# Patient Record
Sex: MALE | Race: BLACK OR AFRICAN AMERICAN | Employment: FULL TIME | URBAN - NONMETROPOLITAN AREA
[De-identification: names, ages, dates, MRNs, and addresses within clinical notes are randomized per-mention and may not be internally consistent; named-entity substitution may affect disease eponyms.]

---

## 2017-08-24 ENCOUNTER — HOSPITAL ENCOUNTER (EMERGENCY)
Age: 52
Discharge: HOME | End: 2017-08-24

## 2017-08-24 ENCOUNTER — APPOINTMENT (OUTPATIENT)
Dept: GENERAL RADIOLOGY | Age: 52
End: 2017-08-24

## 2017-08-24 VITALS
HEART RATE: 88 BPM | OXYGEN SATURATION: 98 % | TEMPERATURE: 98.1 F | SYSTOLIC BLOOD PRESSURE: 119 MMHG | DIASTOLIC BLOOD PRESSURE: 83 MMHG | RESPIRATION RATE: 18 BRPM

## 2017-08-24 DIAGNOSIS — M54.50 ACUTE MIDLINE LOW BACK PAIN WITHOUT SCIATICA: Primary | ICD-10-CM

## 2017-08-24 PROCEDURE — 99283 EMERGENCY DEPT VISIT LOW MDM: CPT

## 2017-08-24 ASSESSMENT — PAIN DESCRIPTION - LOCATION: LOCATION: BACK

## 2017-08-24 ASSESSMENT — PAIN SCALES - WONG BAKER: WONGBAKER_NUMERICALRESPONSE: 6

## 2017-08-24 ASSESSMENT — ENCOUNTER SYMPTOMS
BACK PAIN: 1
SHORTNESS OF BREATH: 0
COLOR CHANGE: 0

## 2017-08-24 ASSESSMENT — PAIN DESCRIPTION - ORIENTATION: ORIENTATION: LOWER;MID

## 2017-08-24 ASSESSMENT — PAIN DESCRIPTION - PAIN TYPE: TYPE: ACUTE PAIN

## 2017-08-25 ASSESSMENT — ENCOUNTER SYMPTOMS
NAUSEA: 0
RHINORRHEA: 0
WHEEZING: 0
EYE REDNESS: 0
VOMITING: 0
COUGH: 0
DIARRHEA: 0
ABDOMINAL PAIN: 0
EYE DISCHARGE: 0
CONSTIPATION: 0
SORE THROAT: 0

## 2017-09-06 ENCOUNTER — HOSPITAL ENCOUNTER (EMERGENCY)
Age: 52
Discharge: HOME | End: 2017-09-06

## 2017-09-06 VITALS
HEART RATE: 85 BPM | HEIGHT: 74 IN | DIASTOLIC BLOOD PRESSURE: 82 MMHG | BODY MASS INDEX: 18.61 KG/M2 | RESPIRATION RATE: 18 BRPM | TEMPERATURE: 99.1 F | SYSTOLIC BLOOD PRESSURE: 120 MMHG | WEIGHT: 145 LBS | OXYGEN SATURATION: 99 %

## 2017-09-06 DIAGNOSIS — K04.7 DENTAL ABSCESS: Primary | ICD-10-CM

## 2017-09-06 PROCEDURE — 6370000000 HC RX 637 (ALT 250 FOR IP): Performed by: NURSE PRACTITIONER

## 2017-09-06 PROCEDURE — 6360000002 HC RX W HCPCS: Performed by: NURSE PRACTITIONER

## 2017-09-06 PROCEDURE — 99282 EMERGENCY DEPT VISIT SF MDM: CPT

## 2017-09-06 RX ORDER — PENICILLIN V POTASSIUM 500 MG/1
500 TABLET ORAL 4 TIMES DAILY
Qty: 28 TABLET | Refills: 0 | Status: SHIPPED | OUTPATIENT
Start: 2017-09-06 | End: 2017-09-13

## 2017-09-06 RX ADMIN — PENICILLIN G BENZATHINE 0.6 MILLION UNITS: 1200000 INJECTION, SUSPENSION INTRAMUSCULAR at 09:51

## 2017-09-06 RX ADMIN — Medication 10 ML: at 10:11

## 2017-09-06 ASSESSMENT — ENCOUNTER SYMPTOMS
COUGH: 0
WHEEZING: 0
BLOOD IN STOOL: 0
PHOTOPHOBIA: 0
RHINORRHEA: 0
BACK PAIN: 0
EYE REDNESS: 0
ABDOMINAL DISTENTION: 0
SINUS PRESSURE: 0
VOMITING: 0
ABDOMINAL PAIN: 0
NAUSEA: 0
VOICE CHANGE: 0
SHORTNESS OF BREATH: 0
COLOR CHANGE: 0
DIARRHEA: 0
CONSTIPATION: 0
CHEST TIGHTNESS: 0
SORE THROAT: 0

## 2020-01-23 ENCOUNTER — HOSPITAL ENCOUNTER (EMERGENCY)
Age: 55
Discharge: HOME | End: 2020-01-23

## 2020-01-23 VITALS
HEART RATE: 73 BPM | TEMPERATURE: 98.3 F | RESPIRATION RATE: 15 BRPM | OXYGEN SATURATION: 99 % | BODY MASS INDEX: 19.25 KG/M2 | DIASTOLIC BLOOD PRESSURE: 76 MMHG | HEIGHT: 74 IN | SYSTOLIC BLOOD PRESSURE: 137 MMHG | WEIGHT: 150 LBS

## 2020-01-23 PROCEDURE — 99282 EMERGENCY DEPT VISIT SF MDM: CPT

## 2020-01-23 PROCEDURE — 6370000000 HC RX 637 (ALT 250 FOR IP): Performed by: NURSE PRACTITIONER

## 2020-01-23 RX ORDER — LIDOCAINE 4 G/G
1 PATCH TOPICAL DAILY
Status: DISCONTINUED | OUTPATIENT
Start: 2020-01-23 | End: 2020-01-23 | Stop reason: HOSPADM

## 2020-01-23 RX ORDER — NAPROXEN 250 MG/1
500 TABLET ORAL ONCE
Status: COMPLETED | OUTPATIENT
Start: 2020-01-23 | End: 2020-01-23

## 2020-01-23 RX ORDER — NAPROXEN 500 MG/1
500 TABLET ORAL 2 TIMES DAILY WITH MEALS
Qty: 30 TABLET | Refills: 0 | Status: SHIPPED | OUTPATIENT
Start: 2020-01-23 | End: 2020-10-14

## 2020-01-23 RX ORDER — LIDOCAINE 4 G/G
1 PATCH TOPICAL DAILY
Qty: 30 PATCH | Refills: 0 | Status: SHIPPED | OUTPATIENT
Start: 2020-01-23 | End: 2020-02-22

## 2020-01-23 RX ADMIN — NAPROXEN 500 MG: 250 TABLET ORAL at 11:33

## 2020-01-23 ASSESSMENT — PAIN DESCRIPTION - ORIENTATION: ORIENTATION: LOWER

## 2020-01-23 ASSESSMENT — ENCOUNTER SYMPTOMS
SORE THROAT: 0
COLOR CHANGE: 0
SHORTNESS OF BREATH: 0
DIARRHEA: 0
NAUSEA: 0
ABDOMINAL PAIN: 0
CHEST TIGHTNESS: 0
BACK PAIN: 1
VOMITING: 0
ABDOMINAL DISTENTION: 0
RHINORRHEA: 0

## 2020-01-23 ASSESSMENT — PAIN SCALES - GENERAL
PAINLEVEL_OUTOF10: 9
PAINLEVEL_OUTOF10: 9

## 2020-01-23 ASSESSMENT — PAIN DESCRIPTION - DESCRIPTORS: DESCRIPTORS: SHARP;SQUEEZING

## 2020-01-23 ASSESSMENT — PAIN DESCRIPTION - PAIN TYPE: TYPE: ACUTE PAIN

## 2020-01-23 ASSESSMENT — PAIN DESCRIPTION - LOCATION: LOCATION: BACK

## 2020-01-23 NOTE — ED PROVIDER NOTES
[facility] Emergency Department    CHIEF COMPLAINT       Chief Complaint   Patient presents with    Back Pain       Nurses Notes reviewed and I agree except as noted in the HPI. HISTORY OF PRESENT ILLNESS    Chrystal Marin sumaya 49 y.o. male who presents to the ED for evaluation of back pain. Patient states today while he was at work he was lifting a box and he noted sudden lower spinal pain. He denies any radiation of the pain down either legs or up into his upper back. He notes the character \"hurts\". He notes the pain is worse with movement mostly bending over. He denies any difficulty with ambulation, denies any fever, denies unilateral or bilateral paresthesias, denies any incontinence, denies any history of cancer. Notes he is a heavy drinker drinks a sixpack every day and on the weekends drinks a liter of vodka. He smokes a pack of cigarettes every day. Denies any chest pain shortness of breath nausea vomiting abdominal pain. Pain description:  Onset: Today  Location: Lower back  Duration: Constant  Character: \"Hurts\"  Aggravating factors: Movement  Radiation: Denies  Severity: 8/10    Experienced previously: Denies    HPI was provided by the patient. REVIEW OF SYSTEMS     Review of Systems   Constitutional: Negative for chills and fever. HENT: Negative for congestion, rhinorrhea and sore throat. Respiratory: Negative for chest tightness and shortness of breath. Cardiovascular: Negative for chest pain. Gastrointestinal: Negative for abdominal distention, abdominal pain, diarrhea, nausea and vomiting. Genitourinary: Negative for decreased urine volume and difficulty urinating. Negative for incontinence    Musculoskeletal: Positive for back pain. Negative for arthralgias, gait problem, joint swelling, myalgias, neck pain and neck stiffness. Skin: Negative for color change and wound. Allergic/Immunologic: Negative for immunocompromised state.    Neurological: Negative for dizziness, weakness, light-headedness, numbness and headaches. Hematological: Does not bruise/bleed easily. Psychiatric/Behavioral: Negative for agitation, behavioral problems and confusion. PAST MEDICAL HISTORY     Past Medical History:   Diagnosis Date    Hypertension        SURGICALHISTORY      has no past surgical history on file. CURRENT MEDICATIONS       Discharge Medication List as of 1/23/2020 11:23 AM      CONTINUE these medications which have NOT CHANGED    Details   Magic Mouthwash (MIRACLE MOUTHWASH) Swish and spit 5 mLs 4 times daily as needed for Irritation 1:1:1, lidocaine, diphenhydramine, Maalox, Disp-240 mL, R-0Print             ALLERGIES     has No Known Allergies. FAMILY HISTORY     has no family status information on file. family history is not on file. SOCIAL HISTORY       Social History     Socioeconomic History    Marital status:      Spouse name: Not on file    Number of children: Not on file    Years of education: Not on file    Highest education level: Not on file   Occupational History    Not on file   Social Needs    Financial resource strain: Not on file    Food insecurity:     Worry: Not on file     Inability: Not on file    Transportation needs:     Medical: Not on file     Non-medical: Not on file   Tobacco Use    Smoking status: Current Every Day Smoker     Packs/day: 0.50     Types: Cigarettes    Smokeless tobacco: Never Used   Substance and Sexual Activity    Alcohol use:  Yes     Alcohol/week: 42.0 standard drinks     Types: 42 Cans of beer per week     Comment: 6 pack daily    Drug use: No    Sexual activity: Not on file   Lifestyle    Physical activity:     Days per week: Not on file     Minutes per session: Not on file    Stress: Not on file   Relationships    Social connections:     Talks on phone: Not on file     Gets together: Not on file     Attends [employer] service: Not on file     Active member of club or organization: Not on file     Attends meetings of clubs or organizations: Not on file     Relationship status: Not on file    Intimate partner violence:     Fear of current or ex partner: Not on file     Emotionally abused: Not on file     Physically abused: Not on file     Forced sexual activity: Not on file   Other Topics Concern    Not on file   Social History Narrative    Not on file       PHYSICAL EXAM     INITIAL VITALS:  height is 6' 2\" (1.88 m) and weight is 150 lb (68 kg). His oral temperature is 98.3 °F (36.8 °C). His blood pressure is 137/76 and his pulse is 73. His respiration is 15 and oxygen saturation is 99%. Physical Exam  Constitutional:       Appearance: Normal appearance. HENT:      Head: Normocephalic and atraumatic. Nose: Nose normal.      Mouth/Throat:      Mouth: Mucous membranes are moist.   Eyes:      Extraocular Movements: Extraocular movements intact. Conjunctiva/sclera: Conjunctivae normal.   Neck:      Musculoskeletal: Normal range of motion and neck supple. Cardiovascular:      Rate and Rhythm: Normal rate and regular rhythm. Pulses: Normal pulses. Heart sounds: Normal heart sounds. Pulmonary:      Effort: Pulmonary effort is normal.      Breath sounds: Normal breath sounds. Abdominal:      General: Abdomen is flat. Bowel sounds are normal. There is no distension. Palpations: There is no mass. Tenderness: There is no tenderness. Musculoskeletal: Normal range of motion. General: No swelling, tenderness, deformity or signs of injury. Lumbar back: He exhibits bony tenderness (midline s1). He exhibits normal range of motion, no tenderness, no swelling, no edema and no deformity. Skin:     Capillary Refill: Capillary refill takes less than 2 seconds. Neurological:      General: No focal deficit present. Mental Status: He is alert. Sensory: No sensory deficit. Motor: No weakness.       Gait: Gait normal.   Psychiatric: Mood and Affect: Mood normal.         Behavior: Behavior normal.         Thought Content: Thought content normal.         DIFFERENTIAL DIAGNOSIS:   Strain, sprain, fracture  DIAGNOSTIC RESULTS       RADIOLOGY: non-plainfilm images(s) such as CT, Ultrasound and MRI are read by the radiologist.  Plain radiographic images are visualized and preliminarily interpreted by the emergency physician unless otherwise stated below. No orders to display         LABS:   Labs Reviewed - No data to display    EMERGENCY DEPARTMENT COURSE:   Vitals:    Vitals:    01/23/20 1101   BP: 137/76   Pulse: 73   Resp: 15   Temp: 98.3 °F (36.8 °C)   TempSrc: Oral   SpO2: 99%   Weight: 150 lb (68 kg)   Height: 6' 2\" (1.88 m)       MDM    Patient was seen and evaluated in the emergency department, patient appeared to be no acute distress, vital signs were reviewed, no significant findings were noted. Physical exam was completed, he had some midline to left-sided pericervical tenderness at approximately S1. He is able to ambulate with no difficulty had no decreased range of motion that I could note. His deep tendon reflexes were equal bilaterally, he had no red flag symptoms. He was treated with medications below, while here in the emergency department he remained stable. I discussed my findings my plan of care the patient he is amenable discharge. Likely has a lower back strain, will likely improve with time, he is advised to follow-up with a chiropractor if symptoms fail to improve. He verbalized understanding of plan of care. While here in the emergency department maintained stable course appropriate for discharge. Medications   naproxen (NAPROSYN) tablet 500 mg (500 mg Oral Given 1/23/20 1133)       Patient was seenindependently by myself. The patient's final impression and disposition and plan was determined by myself. CRITICAL CARE:   None    CONSULTS:  None    PROCEDURES:  None    FINAL IMPRESSION     1.  Strain of lumbar

## 2020-01-23 NOTE — LETTER
[facility] Emergency Department   [place], [address]          PROOF OF PRESENCE      To Whom It May Concern:    Astrid Sy was present in the Emergency Department at [facility] Emergency Department on 1/23/20.                                      Sincerely,        Eve CRYSTAL

## 2020-01-23 NOTE — ED TRIAGE NOTES
Presents to ER for new onset of back pain that started this morning. Pt states he went to lift something and felt a sharp pain. States the pain has not subsided since then. States pain is in his lower back and he has no hx of back problems. Pt denies any other complaints at this time.

## 2020-09-02 ENCOUNTER — HOSPITAL ENCOUNTER (EMERGENCY)
Age: 55
Discharge: HOME | End: 2020-09-02
Attending: EMERGENCY MEDICINE

## 2020-09-02 ENCOUNTER — APPOINTMENT (OUTPATIENT)
Dept: GENERAL RADIOLOGY | Age: 55
End: 2020-09-02

## 2020-09-02 VITALS
DIASTOLIC BLOOD PRESSURE: 82 MMHG | WEIGHT: 145 LBS | OXYGEN SATURATION: 99 % | HEIGHT: 74 IN | TEMPERATURE: 99.1 F | HEART RATE: 93 BPM | BODY MASS INDEX: 18.61 KG/M2 | SYSTOLIC BLOOD PRESSURE: 145 MMHG | RESPIRATION RATE: 18 BRPM

## 2020-09-02 PROCEDURE — 99282 EMERGENCY DEPT VISIT SF MDM: CPT

## 2020-09-02 PROCEDURE — 73630 X-RAY EXAM OF FOOT: CPT

## 2020-09-02 PROCEDURE — 6370000000 HC RX 637 (ALT 250 FOR IP): Performed by: EMERGENCY MEDICINE

## 2020-09-02 RX ORDER — HYDROCODONE BITARTRATE AND ACETAMINOPHEN 5; 325 MG/1; MG/1
1 TABLET ORAL ONCE
Status: COMPLETED | OUTPATIENT
Start: 2020-09-02 | End: 2020-09-02

## 2020-09-02 RX ADMIN — HYDROCODONE BITARTRATE AND ACETAMINOPHEN 1 TABLET: 5; 325 TABLET ORAL at 11:56

## 2020-09-02 ASSESSMENT — PAIN DESCRIPTION - LOCATION: LOCATION: TOE (COMMENT WHICH ONE)

## 2020-09-02 ASSESSMENT — PAIN DESCRIPTION - ORIENTATION: ORIENTATION: LEFT

## 2020-09-02 ASSESSMENT — PAIN SCALES - GENERAL
PAINLEVEL_OUTOF10: 10
PAINLEVEL_OUTOF10: 10

## 2020-09-02 ASSESSMENT — PAIN DESCRIPTION - PAIN TYPE: TYPE: ACUTE PAIN

## 2020-09-02 NOTE — ED NOTES
Patient presents to the ED with complaints of having left big toe pain and swelling. He states that he kicked a wall in his house this morning. He has no other complaints at this time.       Stacie Nair LPN  97/84/03 [id number]

## 2020-09-02 NOTE — LETTER
[facility] Emergency Department   [place], [address]          PROOF OF PRESENCE      To Whom It May Concern:    Amanda Freeman was present in the Emergency Department at [facility] [place] Emergency Department on 9/2/2020.                                      Sincerely,        Registered Nurse

## 2020-09-02 NOTE — ED PROVIDER NOTES
[facility] EMERGENCY DEPT      CHIEF COMPLAINT       Chief Complaint   Patient presents with    Toe Pain     left big toe       Nurses Notes reviewed and I agree except as noted in the HPI. HISTORY OF PRESENT ILLNESS    Iqra Bryant is a 48 y.o. male who presents complaint of toe pain, patient kicked a wall by mistake last night. Onset: acute  Duration:last night   Timing: Constant  Location of Pain: Left toe pain  Intesity/severity: Mild  Modifying Factors: Trauma  Relieved by;  Previous Episodes; Tx Before arrival: None  REVIEW OF SYSTEMS      Review of Systems   Constitutional: Negative for fever, chills, diaphoresis and fatigue. HENT: Negative for congestion, drooling, facial swelling and sore throat. Eyes: Negative for photophobia, pain and discharge. Respiratory: Negative for cough, shortness of breath, wheezing and stridor. Cardiovascular: Negative for chest pain, palpitations and leg swelling. Gastrointestinal: Negative for abdominal pain, blood in stool and abdominal distention. Genitourinary: Negative for dysuria, urgency, hematuria and difficulty urinating. Musculoskeletal: Negative for gait problem, neck pain and neck stiffness. Positive for left great toe pain  Skin; No rash, No itching  Neurological: Negative for seizures, weakness and numbness. Psychiatric/Behavioral: Negative for hallucinations, confusion and agitation. PAST MEDICAL HISTORY    has a past medical history of Hypertension. SURGICAL HISTORY      has no past surgical history on file.     CURRENT MEDICATIONS       Discharge Medication List as of 9/2/2020 11:23 AM      CONTINUE these medications which have NOT CHANGED    Details   naproxen (NAPROSYN) 500 MG tablet Take 1 tablet by mouth 2 times daily (with meals) for 30 doses, Disp-30 tablet, R-0Print      Magic Mouthwash (MIRACLE MOUTHWASH) Swish and spit 5 mLs 4 times daily as needed for Irritation 1:1:1, lidocaine, diphenhydramine, Maalox, Disp-240 mL, R-0Print             ALLERGIES     has No Known Allergies. FAMILY HISTORY     has no family status information on file. family history is not on file. SOCIAL HISTORY      reports that he has been smoking cigarettes. He has been smoking about 0.50 packs per day. He has never used smokeless tobacco. He reports current alcohol use of about 42.0 standard drinks of alcohol per week. He reports that he does not use drugs. PHYSICAL EXAM     INITIAL VITALS:  height is 6' 2\" (1.88 m) and weight is 145 lb (65.8 kg). His oral temperature is 99.1 °F (37.3 °C). His blood pressure is 145/82 (abnormal) and his pulse is 93. His respiration is 18 and oxygen saturation is 99%. Physical Exam   Constitutional:  well-developed and well-nourished. HENT: Head: Normocephalic, atraumatic, Bilateral external ears normal, Oropharynx mosit, No oral exudates, Nose normal.   Eyes: PERRL, EOMI, Conjunctiva normal, No discharge. No scleral icterus  Neck: Normal range of motion, No tenderness, Supple  Cardiovascular: Normal rate, regular rhythm, S1 normal and S2 normal.  Exam reveals no gallop. Pulmonary/Chest: Effort normal and breath sounds normal. No accessory muscle usage or stridor. No respiratory distress. no wheezes. has no rales. exhibits no tenderness. Abdominal: Soft. Bowel sounds are normal.  exhibits no distension. There is no tenderness. There is no rebound and no guarding. Extremities: No edema, left great toe tenderness, no cyanosis, no clubbing. Neurological: Alert and oriented ×3, normal motor function, normal sensory function, no focal deficits. GCS 15  Skin: Skin is warm, dry and intact. No rash noted. No erythema.    Psychiatric: Affect normal, judgment normal, mood normal.  DIFFERENTIAL DIAGNOSIS:   Toe fracture chest contusion,    DIAGNOSTIC RESULTS     EKG: All EKG's are interpreted by the Emergency Department Physician who either signs or Co-signs this chart in the absence of a cardiologist.      RADIOLOGY: non-plain film images(s) such as CT, Ultrasound and MRI are read by the radiologist.  Plain radiographic images are visualized and preliminarily interpreted by the emergency physician unless otherwise stated below. LABS:   Labs Reviewed - No data to display    EMERGENCY DEPARTMENT COURSE:   Vitals:    Vitals:    09/02/20 1027   BP: (!) 145/82   Pulse: 93   Resp: 18   Temp: 99.1 °F (37.3 °C)   TempSrc: Oral   SpO2: 99%   Weight: 145 lb (65.8 kg)   Height: 6' 2\" (1.88 m)     Patient presenting with left toe pain, states that he kicked a wall by accident. CRITICAL CARE:       CONSULTS:  None    PROCEDURES:  None    FINAL IMPRESSION      1.  Contusion of left great toe without damage to nail, initial encounter          DISPOSITION/PLAN   Decision To Discharge    PATIENT REFERRED TO:  [address] [place] [address] [address] [address] EMERGENCY DEPT  [address] [address]  [phone]    As needed      DISCHARGE MEDICATIONS:  Discharge Medication List as of 9/2/2020 11:23 AM          (Please note that portions of this note were completed with a voice recognition program.  Efforts were made to edit the dictations but occasionally words are mis-transcribed.)    DO Faith Arriaza DO  09/02/20 1717

## 2020-10-14 ENCOUNTER — HOSPITAL ENCOUNTER (EMERGENCY)
Age: 55
Discharge: HOME | End: 2020-10-14

## 2020-10-14 ENCOUNTER — APPOINTMENT (OUTPATIENT)
Dept: GENERAL RADIOLOGY | Age: 55
End: 2020-10-14

## 2020-10-14 VITALS
BODY MASS INDEX: 18.61 KG/M2 | DIASTOLIC BLOOD PRESSURE: 67 MMHG | OXYGEN SATURATION: 98 % | WEIGHT: 145 LBS | SYSTOLIC BLOOD PRESSURE: 134 MMHG | RESPIRATION RATE: 14 BRPM | HEART RATE: 87 BPM | HEIGHT: 74 IN | TEMPERATURE: 99.1 F

## 2020-10-14 PROCEDURE — 99281 EMR DPT VST MAYX REQ PHY/QHP: CPT

## 2020-10-14 PROCEDURE — 73564 X-RAY EXAM KNEE 4 OR MORE: CPT

## 2020-10-14 PROCEDURE — 99282 EMERGENCY DEPT VISIT SF MDM: CPT

## 2020-10-14 RX ORDER — NAPROXEN 500 MG/1
250 TABLET ORAL 2 TIMES DAILY WITH MEALS
Qty: 30 TABLET | Refills: 0 | Status: SHIPPED | OUTPATIENT
Start: 2020-10-14 | End: 2021-01-21

## 2020-10-14 ASSESSMENT — ENCOUNTER SYMPTOMS
SHORTNESS OF BREATH: 0
EYES NEGATIVE: 1
GASTROINTESTINAL NEGATIVE: 1

## 2020-10-14 NOTE — ED PROVIDER NOTES
[facility] Emergency Department    CHIEF COMPLAINT       Chief Complaint   Patient presents with    Joint Swelling       Nurses Notes reviewed and I agree except as noted in the HPI. HISTORY OF PRESENT ILLNESS    Demetrice Cruz is a 48 y.o. male who presents to the ED for evaluation of knee swelling. Pt notes swelling to left knee onset 2 weeks ago. He has not taken anything at home for pain. Pt denies any injury to the area. He is able to ambulate on the leg. Denies chest pain, SOB. Pain description:  Onset: Sudden  Location: Left knee  Duration: 2 weeks  Aggravating factors: Deep palpation  Radiation: none  Timing: Constant    Experienced previously: no    HPI was provided by the patient. REVIEW OF SYSTEMS     Review of Systems   Constitutional: Negative. HENT: Negative. Eyes: Negative. Respiratory: Negative for shortness of breath. Cardiovascular: Negative for chest pain. Gastrointestinal: Negative. Genitourinary: Negative. Musculoskeletal: Positive for arthralgias and joint swelling. Neurological: Negative for dizziness and light-headedness. PAST MEDICAL HISTORY     Past Medical History:   Diagnosis Date    Hypertension        SURGICALHISTORY      has no past surgical history on file. CURRENT MEDICATIONS       Discharge Medication List as of 10/14/2020  3:37 PM          ALLERGIES     has No Known Allergies. FAMILY HISTORY     has no family status information on file. family history is not on file.     SOCIAL HISTORY       Social History     Socioeconomic History    Marital status:      Spouse name: Not on file    Number of children: Not on file    Years of education: Not on file    Highest education level: Not on file   Occupational History    Not on file   Social Needs    Financial resource strain: Not on file    Food insecurity     Worry: Not on file     Inability: Not on file    Transportation needs     Medical: Not on file     Non-medical: Not on file   Tobacco Use    Smoking status: Current Every Day Smoker     Packs/day: 0.50     Types: Cigarettes    Smokeless tobacco: Never Used   Substance and Sexual Activity    Alcohol use: Yes     Alcohol/week: 42.0 standard drinks     Types: 42 Cans of beer per week     Comment: 6 pack daily    Drug use: No    Sexual activity: Not on file   Lifestyle    Physical activity     Days per week: Not on file     Minutes per session: Not on file    Stress: Not on file   Relationships    Social connections     Talks on phone: Not on file     Gets together: Not on file     Attends Rastafarian service: Not on file     Active member of club or organization: Not on file     Attends meetings of clubs or organizations: Not on file     Relationship status: Not on file    Intimate partner violence     Fear of current or ex partner: Not on file     Emotionally abused: Not on file     Physically abused: Not on file     Forced sexual activity: Not on file   Other Topics Concern    Not on file   Social History Narrative    Not on file       PHYSICAL EXAM     INITIAL VITALS:  height is 6' 2\" (1.88 m) and weight is 145 lb (65.8 kg). His oral temperature is 99.1 °F (37.3 °C). His blood pressure is 134/67 and his pulse is 87. His respiration is 14 and oxygen saturation is 98%. Physical Exam  Vitals signs and nursing note reviewed. Constitutional:       General: He is not in acute distress. Appearance: Normal appearance. He is not ill-appearing, toxic-appearing or diaphoretic. HENT:      Head: Normocephalic and atraumatic. Eyes:      Extraocular Movements: Extraocular movements intact. Conjunctiva/sclera: Conjunctivae normal.      Pupils: Pupils are equal, round, and reactive to light. Neck:      Musculoskeletal: Normal range of motion and neck supple. Cardiovascular:      Rate and Rhythm: Normal rate and regular rhythm. Pulses: Normal pulses. Heart sounds: Normal heart sounds.    Pulmonary: Effort: Pulmonary effort is normal. No respiratory distress. Breath sounds: Normal breath sounds. Abdominal:      General: Abdomen is flat. Musculoskeletal: Normal range of motion. Left knee: He exhibits swelling and bony tenderness (very minimal). He exhibits no ecchymosis and no deformity. Skin:     General: Skin is warm and dry. Neurological:      General: No focal deficit present. Mental Status: He is alert and oriented to person, place, and time. Psychiatric:         Mood and Affect: Mood normal.         Behavior: Behavior normal.         DIFFERENTIAL DIAGNOSIS:   Knee effusion, Baker's cyst, bursitis, tendinitis    DIAGNOSTIC RESULTS       RADIOLOGY: non-plainfilm images(s) such as CT, Ultrasound and MRI are read by the radiologist.  Plain radiographic images are visualized and preliminarily interpreted by the emergency physician unless otherwise stated below. XR KNEE LEFT (MIN 4 VIEWS)   Final Result    IMPRESSION:    Soft tissue swelling over the anterior knee. No fracture or subluxation. **This report has been created using voice recognition software. It may contain minor errors which are inherent in voice recognition technology. **      Final report electronically signed by Dr. Joyce Wise on 10/14/2020 2:47 PM            LABS:   Labs Reviewed - No data to display    EMERGENCY DEPARTMENT COURSE:   Vitals:    Vitals:    10/14/20 1409   BP: 134/67   Pulse: 87   Resp: 14   Temp: 99.1 °F (37.3 °C)   TempSrc: Oral   SpO2: 98%   Weight: 145 lb (65.8 kg)   Height: 6' 2\" (1.88 m)         MDM    Patient was seen and evaluated in the emergency department, patient appeared to be in no acute distress, vital signs were reviewed, no septic bodies are noted. Physical exam is completed, he has some posterior knee edema consistent with a Baker's cyst, he has some anterior knee edema, possibly bursitis. X-rays were performed and soft tissue swelling was noted to the anterior knee. Discussed my findings and plan of care the patient has minimal discharge. We will put him on some anti-inflammatory medicine, he is advised to use Ace wrap. He verbalized understanding of plan of care. Medications - No data to display    Patient was seenindependently by myself. The patient's final impression and disposition and plan was determined by myself. CRITICAL CARE:   None    CONSULTS:  None    PROCEDURES:  None    FINAL IMPRESSION     1. Effusion of left knee joint    2.  Synovial cyst of left popliteal space          DISPOSITION/PLAN   Patient discharged in stable condition    PATIENT REFERREDTO:  Clarence Orozco   [address] [place] [phone]  Call in 1 week  For follow up and evaluation if symptoms worsen      DISCHARGE MEDICATIONS:  Discharge Medication List as of 10/14/2020  3:37 PM          (Please note that portions of this note were completed with a voice recognition program.  Efforts were made to edit the dictations but occasionally words are mis-transcribed.)        [facility] Counts, ORTIZ 10/14/20 9:38 PM    [facility] CountsYoandy, APRN - CNP  10/14/20 [id number]

## 2020-10-14 NOTE — LETTER
[facility] [address] [address] EMERGENCY DEPT  [address] [place] [id number]  Phone: [phone]               October 14, 2020    Patient: Ramone Bryan   YOB: 1965   Date of Visit: 10/14/2020       To Whom It May Concern:    Sherif Townsend was seen and treated in our emergency department on 10/14/2020. He may return to work on 10/15/2020.       Sincerely,       STEPHANIE Robins CNP         Signature:__________________________________

## 2021-01-21 ENCOUNTER — HOSPITAL ENCOUNTER (EMERGENCY)
Age: 56
Discharge: HOME | End: 2021-01-21
Payer: COMMERCIAL

## 2021-01-21 VITALS
HEART RATE: 82 BPM | OXYGEN SATURATION: 99 % | SYSTOLIC BLOOD PRESSURE: 129 MMHG | HEIGHT: 74 IN | TEMPERATURE: 98.1 F | RESPIRATION RATE: 18 BRPM | BODY MASS INDEX: 19.25 KG/M2 | WEIGHT: 150 LBS | DIASTOLIC BLOOD PRESSURE: 72 MMHG

## 2021-01-21 DIAGNOSIS — M79.601 RIGHT ARM PAIN: ICD-10-CM

## 2021-01-21 DIAGNOSIS — M25.521 ARTHRALGIA OF RIGHT ELBOW: ICD-10-CM

## 2021-01-21 DIAGNOSIS — M79.602 LEFT ARM PAIN: Primary | ICD-10-CM

## 2021-01-21 PROCEDURE — 99282 EMERGENCY DEPT VISIT SF MDM: CPT

## 2021-01-21 PROCEDURE — 6370000000 HC RX 637 (ALT 250 FOR IP): Performed by: NURSE PRACTITIONER

## 2021-01-21 RX ORDER — TIZANIDINE HYDROCHLORIDE 4 MG/1
4 CAPSULE, GELATIN COATED ORAL 3 TIMES DAILY PRN
Qty: 24 CAPSULE | Refills: 0 | Status: SHIPPED | OUTPATIENT
Start: 2021-01-21 | End: 2022-03-24

## 2021-01-21 RX ORDER — ETODOLAC 400 MG/1
400 TABLET, FILM COATED ORAL 2 TIMES DAILY
Qty: 60 TABLET | Refills: 3 | Status: SHIPPED | OUTPATIENT
Start: 2021-01-21

## 2021-01-21 RX ORDER — TRAMADOL HYDROCHLORIDE 50 MG/1
50 TABLET ORAL ONCE
Status: COMPLETED | OUTPATIENT
Start: 2021-01-21 | End: 2021-01-21

## 2021-01-21 RX ADMIN — TRAMADOL HYDROCHLORIDE 50 MG: 50 TABLET ORAL at 12:13

## 2021-01-21 ASSESSMENT — ENCOUNTER SYMPTOMS
VOMITING: 0
NAUSEA: 0
SHORTNESS OF BREATH: 0
COLOR CHANGE: 0

## 2021-01-21 ASSESSMENT — PAIN SCALES - GENERAL: PAINLEVEL_OUTOF10: 7

## 2021-01-21 NOTE — ED NOTES
Patient presents to the ED with complaints of bilateral achy arms. He denies having pain in his arms. He has no other complaints at this time.       Mortimer Lange, LPN  39/58/[id number]

## 2021-01-21 NOTE — ED PROVIDER NOTES
[facility] COMPLAINT       Chief Complaint   Patient presents with    Arm Pain     bilateral       Nurses Notes reviewed and I agree except as noted in the HPI. HISTORY OF PRESENT ILLNESS    Christine Medina is a 48 y.o. male who presents to the Emergency Department for the evaluation of bilateral arm pain. 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 This pain has been going on for a long time now and not getting any better. Stated it had a gradual onset and is an achy pain that is intermittent at the distal aspect of his bicep muscle and in the cubital fossa bilaterally. Denies radiation. He doesn't have any pain at rest, only upon flexion of the elbow. Tylenol provided him no relief. Stated he has had no prior injury. Denies fever, chills, decrease ROM, paresthesias, numbness, swelling. The HPI was provided by the patient. REVIEW OF SYSTEMS     Review of Systems   Constitutional: Negative for activity change, chills and fever. Respiratory: Negative for shortness of breath. Cardiovascular: Negative for chest pain. Gastrointestinal: Negative for nausea and vomiting. Musculoskeletal: Positive for myalgias. Negative for arthralgias, gait problem and joint swelling. Negative for decrease ROM   Skin: Negative for color change and rash. Negative for ecchymosis. Neurological: Negative for weakness and numbness. PAST MEDICAL HISTORY    has a past medical history of Hypertension. SURGICAL HISTORY      has no past surgical history on file. CURRENT MEDICATIONS       Discharge Medication List as of 1/21/2021 12:20 PM          ALLERGIES     has No Known Allergies. FAMILY HISTORY     has no family status information on file. family history is not on file. SOCIAL HISTORY      reports that he has been smoking cigarettes.  He has been smoking about 0.50 Orthopedic Postop Progress Note    Postop day: s/p TKA    ID: The patient is a 64 y.o. female status post: TKA, doing well, pain controlled    Overnight Events: naeon per nurse  Worked with PT    Vitals:    11/24/20 1326   BP:    Pulse:    Resp: 16   Temp:        Drain Output  11/23 0701 - 11/24 0700  In: 2350   Out: 100     Physical Exam:  NAD, A/O x 3.  Wound c/d/i with clean dressing.  No focal motor or sensory deficits noted.    Assessment: The patient is a 64 y.o. female status post: tka    Plan:  1) Antibiotics: psot op ancef x 24 hrs then po abx x 24 h  2) Weight bearing status: wbat  3) Labs: reviewed  4) DVT Prophylaxis: asa  5) Lines/Drains: piv  6) Dispo: home with HH after PM PT    MD Sarita     packs per day. He has never used smokeless tobacco. He reports current alcohol use of about 42.0 standard drinks of alcohol per week. He reports that he does not use drugs. PHYSICAL EXAM     INITIAL VITALS:  height is 6' 2\" (1.88 m) and weight is 150 lb (68 kg). His oral temperature is 98.1 °F (36.7 °C). His blood pressure is 129/72 and his pulse is 82. His respiration is 18 and oxygen saturation is 99%. Physical Exam  Vitals signs and nursing note reviewed. Constitutional:       General: He is awake. He is not in acute distress. Appearance: Normal appearance. He is well-developed and normal weight. He is not ill-appearing, toxic-appearing or diaphoretic. Cardiovascular:      Pulses:           Radial pulses are 2+ on the right side and 2+ on the left side. Musculoskeletal:      Right shoulder: Normal. He exhibits normal range of motion, no tenderness, no bony tenderness, no swelling and no deformity. Left shoulder: Normal. He exhibits normal range of motion, no tenderness, no bony tenderness, no swelling and no deformity. Right elbow: He exhibits normal range of motion, no swelling, no effusion and no deformity. No tenderness found. No radial head, no medial epicondyle, no lateral epicondyle and no olecranon process tenderness noted. Left elbow: He exhibits normal range of motion, no swelling, no effusion and no deformity. No tenderness found. No radial head, no medial epicondyle, no lateral epicondyle and no olecranon process tenderness noted. Right wrist: Normal. He exhibits normal range of motion, no tenderness, no bony tenderness, no swelling and no deformity. Left wrist: Normal. He exhibits normal range of motion, no tenderness, no bony tenderness, no swelling and no deformity. Comments: Right and left elbow and cubital fossa-No erythema, edema, lesions, nodules. No warmth or tenderness to palpation.  AROM normal. Flexion of elbow reproduces pain in distal aspect of bicep muscle bilaterally. Pain reproduced upon flexion against resistance. Left and right wrist- no erythema, edema, ecchymosis, lesions. No warmth or tenderness to palpation. AROM normal. Extension of left wrist reproduces pain. Extension and flexion of left wrist against resistance reproduces pain. 5/5 strength upper extremities. Skin:     General: Skin is warm. Capillary Refill: Capillary refill takes less than 2 seconds. Coloration: Skin is not cyanotic or pale. Neurological:      Mental Status: He is alert and oriented to person, place, and time. Sensory: Sensation is intact. No sensory deficit. Motor: Motor function is intact. No weakness or atrophy. Deep Tendon Reflexes:      Reflex Scores:       Brachioradialis reflexes are 2+ on the right side and 2+ on the left side. Psychiatric:         Behavior: Behavior is cooperative. DIFFERENTIAL DIAGNOSIS:   Muscle strain, tendinopathy, superficial DVT arm, contusion, fracture, arthritis    DIAGNOSTIC RESULTS     EKG: All EKG's are interpreted by the Emergency Department Physician who either signs or Co-signs this chart in the absence of a cardiologist.    None    RADIOLOGY: non-plainfilm images(s) such as CT, Ultrasound and MRI are read by the radiologist.    No orders to display       LABS:     Labs Reviewed - No data to display    EMERGENCY DEPARTMENT COURSE:   Vitals:    Vitals:    01/21/21 1037   BP: 129/72   Pulse: 82   Resp: 18   Temp: 98.1 °F (36.7 °C)   TempSrc: Oral   SpO2: 99%   Weight: 150 lb (68 kg)   Height: 6' 2\" (1.88 m)       11:09 AM EST: The patient was seen and evaluated. MDM:  Patient seen evaluated a for bilateral arm pain. Patient has pain with flexion at distal end of bicep. Believe that this is likely repetitive use. Pain was treated appropriately, no imaging indicated. Discussed rest, muscle relaxers, NSAIDs.   Recommended follow-up with orthopedics and occupational health if pain persist.  Patient had no other complaints or concerns. He was amenable plan for discharge and departed the ED in stable condition    CRITICAL CARE:   None    CONSULTS:  None    PROCEDURES:  None    FINAL IMPRESSION      1. Left arm pain    2. Right arm pain    3. Arthralgia of right elbow          DISPOSITION/PLAN   Discharge    PATIENT REFERRED TO:  [address] [address],[address] [facility]. [facility]  Schedule an appointment as soon as possible for a visit         DISCHARGE MEDICATIONS:  Discharge Medication List as of 1/21/2021 12:20 PM      START taking these medications    Details   etodolac (LODINE) 400 MG tablet Take 1 tablet by mouth 2 times daily, Disp-60 tablet, R-3Normal      tiZANidine (ZANAFLEX) 4 MG capsule Take 1 capsule by mouth 3 times daily as needed for Muscle spasms, Disp-24 capsule, R-0Normal             (Please note that portions of this note were completed with a voice recognition program.  Efforts were made to edit the dictations but occasionally words are mis-transcribed.)    The patient was given an opportunity to see the Emergency Attending. The patient voiced understanding that I was a Mid-LevelProvider and was in agreement with being seen independently by myself.      STEPHANIE Charles CNP, 1/21/21, 5:09 PM       STEPHANIE Charles CNP  01/21/21 5246

## 2021-08-01 ENCOUNTER — HOSPITAL ENCOUNTER (EMERGENCY)
Age: 56
Discharge: HOME | End: 2021-08-01
Payer: COMMERCIAL

## 2021-08-01 VITALS
DIASTOLIC BLOOD PRESSURE: 78 MMHG | BODY MASS INDEX: 17.97 KG/M2 | HEIGHT: 74 IN | OXYGEN SATURATION: 100 % | RESPIRATION RATE: 14 BRPM | TEMPERATURE: 98.2 F | HEART RATE: 82 BPM | SYSTOLIC BLOOD PRESSURE: 140 MMHG | WEIGHT: 140 LBS

## 2021-08-01 DIAGNOSIS — M54.41 RIGHT-SIDED LOW BACK PAIN WITH RIGHT-SIDED SCIATICA, UNSPECIFIED CHRONICITY: Primary | ICD-10-CM

## 2021-08-01 PROCEDURE — 99282 EMERGENCY DEPT VISIT SF MDM: CPT

## 2021-08-01 RX ORDER — CYCLOBENZAPRINE HCL 10 MG
10 TABLET ORAL 3 TIMES DAILY PRN
Qty: 6 TABLET | Refills: 0 | Status: SHIPPED | OUTPATIENT
Start: 2021-08-01 | End: 2021-08-04

## 2021-08-01 RX ORDER — CYCLOBENZAPRINE HCL 10 MG
10 TABLET ORAL EVERY 8 HOURS PRN
Status: DISCONTINUED | OUTPATIENT
Start: 2021-08-01 | End: 2021-08-01

## 2021-08-01 ASSESSMENT — PAIN DESCRIPTION - FREQUENCY: FREQUENCY: CONTINUOUS

## 2021-08-01 ASSESSMENT — PAIN SCALES - GENERAL: PAINLEVEL_OUTOF10: 8

## 2021-08-01 ASSESSMENT — PAIN DESCRIPTION - LOCATION: LOCATION: BACK

## 2021-08-01 ASSESSMENT — PAIN DESCRIPTION - ORIENTATION: ORIENTATION: LOWER;RIGHT

## 2021-08-01 ASSESSMENT — PAIN DESCRIPTION - PAIN TYPE: TYPE: ACUTE PAIN

## 2021-08-01 ASSESSMENT — PAIN DESCRIPTION - DESCRIPTORS: DESCRIPTORS: SORE

## 2021-08-01 NOTE — ED TRIAGE NOTES
Patient presents to ER with complaints of right lower back pain the radiates down right leg that started 2 weeks ago. Pain reported 8 on a scale of 0-10, described as sore.

## 2021-08-01 NOTE — ED PROVIDER NOTES
[address]    Pt Name: Dary Mccallum  MRN: [id number]  [place] 1965  Date of evaluation: 9/12/20      I personally saw and examined the patient. I have reviewed and agree with the Resident findings, including all diagnostic interpretations and treatment plans as written. I was present for the key portion of any procedures performed and the inclusive time noted in any critical care statement. History: This patient was seen in conjunction with Juana Scott, resident physician    This is a 56-year-old male who ambulated in through triage. He is complaining of back pain with radiculopathy down the right lower extremity. He ambulates with a completely normal-looking gait. No antalgic gait at all. No history of any previous surgeries. No fever. No recent falls. Reflexes in knees and ankles are normal.  We will put him on some Flexeril. He is advised to have follow-up in 3 to 5 days.                 Tong Escobedo,   08/01/21 8383

## 2021-08-01 NOTE — ED PROVIDER NOTES
[facility] ENCOUNTER          Pt Name: Avery Baker  MRN: [id number]  [place] 1965  Date of evaluation: 8/1/2021  Treating Resident Physician: Sandra Loyd DO  Supervising Physician: Dr. Cayetano Epley       Chief Complaint   Patient presents with    Back Pain     right lower radiates down right leg     History obtained from the patient. HISTORY OF PRESENT ILLNESS    HPI  Avery Baker is a 55 y.o. male who presents to the emergency department for evaluation of right-sided hip pain x1 month. Patient describes the pain as an 8 out of 10, sharp, constant with radiation down the lateral side of the right leg. Worse when laying down or pressure put upon it. Alleviating factors. Patient has tried ice along with OTC topical with minimal relief. Denies any history of surgeries or recent falls/injuries to the area. He does admit to some numbness and tingling on his right side. He denies saddle anesthesia or loss of bowels or bladder. He denies chest pain, sob, cough, pain radiating to back, n/v/d/c, fever, headache, chills. He states he takes no current medications. Has no prior medical history. Denies any surgical history. Does smoke 1/2 ppd x50 years and drinks 6 beers a day. Patient ambulates normally. No antalgic gait identified. The patient has no other acute complaints at this time. REVIEW OF SYSTEMS   Review of Systems    A comprehensive 12 point review of systems has been conducted and is otherwise negative except for what is noted above in the HPI. PAST MEDICAL AND SURGICAL HISTORY     Past Medical History:   Diagnosis Date    Hypertension      No past surgical history on file. MEDICATIONS   No current facility-administered medications for this encounter.     Current Outpatient Medications:     etodolac (LODINE) 400 MG tablet, Take 1 tablet by mouth 2 times daily, Disp: 60 tablet, Rfl: 3   tiZANidine (ZANAFLEX) 4 MG capsule, Take 1 capsule by mouth 3 times daily as needed for Muscle spasms, Disp: 24 capsule, Rfl: 0      SOCIAL HISTORY     Social History     Social History Narrative    Not on file     Social History     Tobacco Use    Smoking status: Current Every Day Smoker     Packs/day: 0.50     Types: Cigarettes    Smokeless tobacco: Never Used   Substance Use Topics    Alcohol use: Yes     Alcohol/week: 42.0 standard drinks     Types: 42 Cans of beer per week     Comment: 6 pack daily    Drug use: No         ALLERGIES   No Known Allergies      FAMILY HISTORY   No family history on file. PREVIOUS RECORDS   Previous records reviewed:         PHYSICAL EXAM     ED Triage Vitals [08/01/21 0753]   BP Temp Temp src Pulse Resp SpO2 Height Weight   (!) 140/78 98.2 °F (36.8 °C) -- 82 14 100 % 6' 2\" (1.88 m) 140 lb (63.5 kg)     Initial vital signs and nursing assessment reviewed and abnormal from HTN. Body mass index is 17.97 kg/m². Pulsoximetry is normal per my interpretation. Additional Vital Signs:  Vitals:    08/01/21 0753   BP: (!) 140/78   Pulse: 82   Resp: 14   Temp: 98.2 °F (36.8 °C)   SpO2: 100%       Physical Exam  Constitutional:       General: He is awake. Appearance: Normal appearance. He is normal weight. HENT:      Head: Normocephalic and atraumatic. Eyes:      Extraocular Movements: Extraocular movements intact. Pupils: Pupils are equal, round, and reactive to light. Neck:      Thyroid: No thyromegaly. Cardiovascular:      Rate and Rhythm: Normal rate and regular rhythm. Heart sounds: S1 normal and S2 normal. No murmur heard. No friction rub. No gallop. Pulmonary:      Effort: Pulmonary effort is normal.      Breath sounds: Normal breath sounds and air entry. Abdominal:      General: There is no distension. There are no signs of injury. Palpations: There is no pulsatile mass. Tenderness: There is no abdominal tenderness.    Musculoskeletal: the assistance of an ED scribe. The transcription may contain errors not detected in proofreading. Please refer to my supervising physician's documentation if my documentation differs.     Electronically Signed: Keaton Lara DO, 08/01/21, 8:45 AM         Josesito Long DO  Resident  08/01/21 1808 Mick Alfonso,   Resident  08/01/21 1808 Mick Alofnso,   Resident  08/04/21 6548

## 2021-08-04 ENCOUNTER — APPOINTMENT (OUTPATIENT)
Dept: GENERAL RADIOLOGY | Age: 56
End: 2021-08-04
Payer: COMMERCIAL

## 2021-08-04 ENCOUNTER — HOSPITAL ENCOUNTER (EMERGENCY)
Age: 56
Discharge: HOME | End: 2021-08-04
Attending: EMERGENCY MEDICINE
Payer: COMMERCIAL

## 2021-08-04 VITALS
WEIGHT: 140 LBS | RESPIRATION RATE: 16 BRPM | TEMPERATURE: 98.5 F | SYSTOLIC BLOOD PRESSURE: 119 MMHG | BODY MASS INDEX: 17.97 KG/M2 | HEIGHT: 74 IN | HEART RATE: 93 BPM | DIASTOLIC BLOOD PRESSURE: 69 MMHG | OXYGEN SATURATION: 100 %

## 2021-08-04 DIAGNOSIS — M19.90 ARTHRITIS: ICD-10-CM

## 2021-08-04 DIAGNOSIS — M25.551 RIGHT HIP PAIN: Primary | ICD-10-CM

## 2021-08-04 PROCEDURE — 73502 X-RAY EXAM HIP UNI 2-3 VIEWS: CPT

## 2021-08-04 PROCEDURE — 99282 EMERGENCY DEPT VISIT SF MDM: CPT

## 2021-08-04 RX ORDER — PREDNISONE 10 MG/1
TABLET ORAL
Qty: 16 TABLET | Refills: 0 | Status: SHIPPED | OUTPATIENT
Start: 2021-08-04

## 2021-08-04 ASSESSMENT — PAIN SCALES - GENERAL: PAINLEVEL_OUTOF10: 9

## 2021-08-04 ASSESSMENT — ENCOUNTER SYMPTOMS
NAUSEA: 0
VOMITING: 0
SORE THROAT: 0
SINUS PRESSURE: 0
WHEEZING: 0
DIARRHEA: 0
CONSTIPATION: 0
CHEST TIGHTNESS: 0
VOICE CHANGE: 0
RHINORRHEA: 0
BACK PAIN: 0
ABDOMINAL PAIN: 0
COUGH: 0
TROUBLE SWALLOWING: 0
SHORTNESS OF BREATH: 0

## 2021-08-04 ASSESSMENT — PAIN DESCRIPTION - FREQUENCY: FREQUENCY: CONTINUOUS

## 2021-08-04 ASSESSMENT — PAIN DESCRIPTION - PROGRESSION: CLINICAL_PROGRESSION: GRADUALLY WORSENING

## 2021-08-04 ASSESSMENT — PAIN DESCRIPTION - DESCRIPTORS: DESCRIPTORS: SHARP

## 2021-08-04 ASSESSMENT — PAIN DESCRIPTION - ONSET: ONSET: ON-GOING

## 2021-08-04 ASSESSMENT — PAIN DESCRIPTION - ORIENTATION: ORIENTATION: RIGHT

## 2021-08-04 ASSESSMENT — PAIN DESCRIPTION - LOCATION: LOCATION: HIP

## 2021-08-04 ASSESSMENT — PAIN DESCRIPTION - PAIN TYPE: TYPE: ACUTE PAIN

## 2021-08-04 NOTE — LETTER
[facility] [address] [address] EMERGENCY DEPT  [address] [place], [place] [id number]  Phone: [phone]               August 4, 2021    Patient: Liz Murray   YOB: 1965   Date of Visit: 8/4/2021       To Whom It May Concern:    Autumn Zamudio was seen and treated in our emergency department on 8/4/2021. He may return to work on 8/5/21.       Sincerely,       Tabatha Saldivar MD         Signature:__________________________________

## 2021-08-04 NOTE — ED NOTES
Pt presents for R hip pain that started 3 weeks ago and no known injury.      Joyce Sarmiento  08/04/21 102

## 2021-08-04 NOTE — ED PROVIDER NOTES
[facility] COMPLAINT    Chief Complaint   Patient presents with    Hip Pain     R       Nurses Notes reviewed and I agree except as noted in the HPI. HPI    Joan Andino is a 55 y.o. male who presents for evaluation of right hip pain for the past 2 weeks. Pain goes the posterolateral aspect buttock, goes to the lateral thigh however denies history of sciatica pain. .  Denies any injury or trauma except for a fall several weeks ago and landed on the right tailbone/hip area. Patient admits that he had chronic back pain and intermittently he sees chiropractor. Denies any fever, no chills no hematuria abdominal pain flank pain      REVIEW OF SYSTEMS    Review of Systems   Constitutional: Negative for appetite change, chills, diaphoresis, fatigue and fever. HENT: Negative for congestion, ear discharge, ear pain, postnasal drip, rhinorrhea, sinus pressure, sore throat, trouble swallowing and voice change. Respiratory: Negative for cough, chest tightness, shortness of breath and wheezing. Cardiovascular: Negative for chest pain, palpitations and leg swelling. Gastrointestinal: Negative for abdominal pain, constipation, diarrhea, nausea and vomiting. Musculoskeletal: Negative for arthralgias, back pain, joint swelling, myalgias, neck pain and neck stiffness. Right hip pain   Skin: Negative for rash. Neurological: Negative for dizziness, syncope, weakness, light-headedness, numbness and headaches. PAST MEDICAL HISTORY     has a past medical history of Hypertension. SURGICAL HISTORY     has no past surgical history on file.     CURRENT MEDICATIONS    Discharge Medication List as of 8/4/2021 12:04 PM      CONTINUE these medications which have NOT CHANGED    Details   cyclobenzaprine (FLEXERIL) 10 MG tablet Take 1 tablet by mouth 3 times daily as needed for Muscle spasms, Disp-6 tablet, R-0Print etodolac (LODINE) 400 MG tablet Take 1 tablet by mouth 2 times daily, Disp-60 tablet, R-3Normal      tiZANidine (ZANAFLEX) 4 MG capsule Take 1 capsule by mouth 3 times daily as needed for Muscle spasms, Disp-24 capsule, R-0Normal             ALLERGIES    has No Known Allergies. FAMILY HISTORY    has no family status information on file. family history is not on file. SOCIAL HISTORY     reports that he has been smoking cigarettes. He has been smoking about 0.50 packs per day. He has never used smokeless tobacco. He reports current alcohol use of about 42.0 standard drinks of alcohol per week. He reports that he does not use drugs. PHYSICAL EXAM      INITIAL VITALS: /69   Pulse 93   Temp 98.5 °F (36.9 °C) (Oral)   Resp 16   Ht 6' 2\" (1.88 m)   Wt 140 lb (63.5 kg)   SpO2 100%   BMI 17.97 kg/m²  Estimated body mass index is 17.97 kg/m² as calculated from the following:    Height as of this encounter: 6' 2\" (1.88 m). Weight as of this encounter: 140 lb (63.5 kg). Physical Exam  Vitals reviewed. Constitutional:       Appearance: He is well-developed. HENT:      Head: Normocephalic and atraumatic. Right Ear: External ear normal.      Left Ear: External ear normal.      Nose: Nose normal.   Eyes:      General: No scleral icterus. Conjunctiva/sclera: Conjunctivae normal.      Pupils: Pupils are equal, round, and reactive to light. Neck:      Thyroid: No thyromegaly. Vascular: No JVD. Cardiovascular:      Rate and Rhythm: Normal rate and regular rhythm. Heart sounds: No murmur heard. No friction rub. Pulmonary:      Effort: Pulmonary effort is normal.      Breath sounds: Normal breath sounds. No wheezing or rales. Chest:      Chest wall: No tenderness. Abdominal:      General: Bowel sounds are normal.      Palpations: Abdomen is soft. There is no mass. Tenderness: There is no abdominal tenderness.    Musculoskeletal:         General: Tenderness (right postero-lateral hip. no tederness on the sciatic notch. negative straight leg test) present. Cervical back: Normal range of motion and neck supple. Lymphadenopathy:      Cervical: No cervical adenopathy. Skin:     Findings: No rash. Neurological:      Mental Status: He is alert and oriented to person, place, and time. Psychiatric:         Behavior: Behavior is cooperative. MEDICAL DECISION MAKING    DIFFERENTIAL DIAGNOSIS:  right pain. osteoarthritis. trochanteric bursitis, sciatica pain, lumbar radiculopathy      DIAGNOSTIC RESULTS    RADIOLOGY:  I have reviewedradiologic plain film image(s). The plain films will be read or overread by the radiologist.  All other non-plain film images(s) such as CT, Ultrasound and MRI have been read by the radiologist.  XR HIP 2-3 VW W PELVIS RIGHT   Final Result   No acute abnormality            **This report has been created using voice recognition software. It may contain minor errors which are inherent in voice recognition technology. **      Final report electronically signed by Dr. Rebecca Bustillo on 8/4/2021 10:54 AM            Vitals:    Vitals:    08/04/21 1025 08/04/21 1026   BP:  119/69   Pulse:  93   Resp:  16   Temp: 98.5 °F (36.9 °C) 98.5 °F (36.9 °C)   TempSrc: Oral Oral   SpO2:  100%   Weight:  140 lb (63.5 kg)   Height:  6' 2\" (1.88 m)       EMERGENCY DEPARTMENT COURSE:    Medications - No data to display    The pt was seen and evaluated by me. Within the department, I observed the pt's vitalsigns to be within acceptable range. Radiological studies were performed, results were reviewed with the patient. I observed the pt's condition to be hemodynamically stable during the duration of their stay. I explained my proposed course of treatment to the pt, and they were amenable to my decision. They were discharged home, and they will return to the ED if their symptoms become more severein nature, or otherwise change.        CRITICAL CARE:

## 2021-08-06 ENCOUNTER — HOSPITAL ENCOUNTER (EMERGENCY)
Age: 56
Discharge: HOME | End: 2021-08-06
Attending: EMERGENCY MEDICINE
Payer: COMMERCIAL

## 2021-08-06 VITALS
OXYGEN SATURATION: 99 % | DIASTOLIC BLOOD PRESSURE: 98 MMHG | HEIGHT: 74 IN | WEIGHT: 104 LBS | BODY MASS INDEX: 13.35 KG/M2 | HEART RATE: 93 BPM | TEMPERATURE: 98.2 F | RESPIRATION RATE: 18 BRPM | SYSTOLIC BLOOD PRESSURE: 153 MMHG

## 2021-08-06 DIAGNOSIS — M79.604 RIGHT LEG PAIN: Primary | ICD-10-CM

## 2021-08-06 PROCEDURE — 99282 EMERGENCY DEPT VISIT SF MDM: CPT

## 2021-08-06 PROCEDURE — 6360000002 HC RX W HCPCS: Performed by: STUDENT IN AN ORGANIZED HEALTH CARE EDUCATION/TRAINING PROGRAM

## 2021-08-06 PROCEDURE — 96372 THER/PROPH/DIAG INJ SC/IM: CPT

## 2021-08-06 RX ORDER — IBUPROFEN 600 MG/1
600 TABLET ORAL EVERY 6 HOURS PRN
Qty: 120 TABLET | Refills: 0 | Status: SHIPPED | OUTPATIENT
Start: 2021-08-06

## 2021-08-06 RX ORDER — ACETAMINOPHEN 500 MG
500 TABLET ORAL 4 TIMES DAILY PRN
Qty: 360 TABLET | Refills: 1 | Status: SHIPPED | OUTPATIENT
Start: 2021-08-06

## 2021-08-06 RX ORDER — KETOROLAC TROMETHAMINE 30 MG/ML
30 INJECTION, SOLUTION INTRAMUSCULAR; INTRAVENOUS ONCE
Status: COMPLETED | OUTPATIENT
Start: 2021-08-06 | End: 2021-08-06

## 2021-08-06 RX ORDER — LIDOCAINE 50 MG/G
1 PATCH TOPICAL DAILY
Qty: 10 PATCH | Refills: 0 | Status: SHIPPED | OUTPATIENT
Start: 2021-08-06 | End: 2021-08-16

## 2021-08-06 RX ADMIN — KETOROLAC TROMETHAMINE 30 MG: 30 INJECTION, SOLUTION INTRAMUSCULAR; INTRAVENOUS at 20:39

## 2021-08-06 ASSESSMENT — PAIN DESCRIPTION - PAIN TYPE: TYPE: ACUTE PAIN

## 2021-08-06 ASSESSMENT — ENCOUNTER SYMPTOMS
VOMITING: 0
SINUS PAIN: 0
NAUSEA: 0
BACK PAIN: 0
ABDOMINAL PAIN: 0
RHINORRHEA: 0
EYE REDNESS: 0
COUGH: 0
SHORTNESS OF BREATH: 0
DIARRHEA: 0
SORE THROAT: 0

## 2021-08-06 ASSESSMENT — PAIN DESCRIPTION - DESCRIPTORS: DESCRIPTORS: SHOOTING

## 2021-08-06 ASSESSMENT — PAIN SCALES - GENERAL: PAINLEVEL_OUTOF10: 10

## 2021-08-06 ASSESSMENT — PAIN DESCRIPTION - ORIENTATION: ORIENTATION: RIGHT

## 2021-08-06 ASSESSMENT — PAIN DESCRIPTION - LOCATION: LOCATION: LEG;HIP

## 2021-08-06 NOTE — ED NOTES
Pt presents to the ed with c/o right hip and leg pain. PT states that he has been seen 3 times for this, that it goes away and comes back, patient states that it came back today after he went to work. PT rates 8/10 pain at this time. No know injury to the area at this time.       Triny [place], [place]  63/00/52 6504

## 2021-08-07 NOTE — ED PROVIDER NOTES
[facility]          Pt Name: Gonzalo West  MRN: [id number]  [place] 1965  Date of evaluation: 8/6/2021  Treating Resident Physician: Lynne Antoine MD  Supervising Physician: Dr. Nadine Rome       Chief Complaint   Patient presents with    Hip Pain    Leg Pain     History obtained from the patient. HISTORY OF PRESENT ILLNESS    HPI  Gonzalo West is a 55 y.o. male with pmhx of htn who presents to the emergency department for evaluation of right hip pain. This is been ongoing for the past week. Denies any recent trauma or injuries. Denies any fecal incontinence mentions some occasional right-sided lower back pain as well. Denies any urinary issues hematuria dysuria. Patient was seen here on 8/4/2021 for right hip pain he underwent an x-ray which showed no abnormality. The patient has no other acute complaints at this time. REVIEW OF SYSTEMS   Review of Systems   Constitutional: Negative for chills and fever. HENT: Negative for rhinorrhea, sinus pain and sore throat. Eyes: Negative for redness. Respiratory: Negative for cough and shortness of breath. Cardiovascular: Negative for chest pain. Gastrointestinal: Negative for abdominal pain, diarrhea, nausea and vomiting. Genitourinary: Negative for dysuria. Musculoskeletal: Negative for back pain and gait problem. Right leg pain. Right hip pain. Skin: Negative for rash. Neurological: Negative for light-headedness and headaches. Psychiatric/Behavioral: Negative for agitation. PAST MEDICAL AND SURGICAL HISTORY     Past Medical History:   Diagnosis Date    Hypertension      No past surgical history on file. MEDICATIONS   No current facility-administered medications for this encounter.     Current Outpatient Medications:     lidocaine (LIDODERM) 5 %, Place 1 patch onto the skin daily for 10 days 12 hours on, 12 hours off., Disp: 10 patch, Rfl: 0    acetaminophen (TYLENOL) 500 MG tablet, Take 1 tablet by mouth 4 times daily as needed for Pain, Disp: 360 tablet, Rfl: 1    ibuprofen (IBU) 600 MG tablet, Take 1 tablet by mouth every 6 hours as needed for Pain, Disp: 120 tablet, Rfl: 0    predniSONE (DELTASONE) 10 MG tablet, 2 tablets 2 times a day for 2 days then 1  Tablet 2 times a day for 2 days, then 1 tablet daily till gone, Disp: 16 tablet, Rfl: 0    etodolac (LODINE) 400 MG tablet, Take 1 tablet by mouth 2 times daily, Disp: 60 tablet, Rfl: 3    tiZANidine (ZANAFLEX) 4 MG capsule, Take 1 capsule by mouth 3 times daily as needed for Muscle spasms, Disp: 24 capsule, Rfl: 0      SOCIAL HISTORY     Social History     Social History Narrative    Not on file     Social History     Tobacco Use    Smoking status: Current Every Day Smoker     Packs/day: 0.50     Types: Cigarettes    Smokeless tobacco: Never Used   Substance Use Topics    Alcohol use: Yes     Alcohol/week: 42.0 standard drinks     Types: 42 Cans of beer per week     Comment: 6 pack daily    Drug use: No         ALLERGIES   No Known Allergies      FAMILY HISTORY   No family history on file. PREVIOUS RECORDS   Previous records reviewed: Patient was seen here on 8/4/2021 for right hip pain. PHYSICAL EXAM     ED Triage Vitals [08/06/21 1942]   BP Temp Temp Source Pulse Resp SpO2 Height Weight   (!) 153/98 98.2 °F (36.8 °C) Oral 93 18 99 % 6' 2\" (1.88 m) 104 lb (47.2 kg)     Initial vital signs and nursing assessment reviewed and normal. Pulsoximetry is normal per my interpretation. Additional Vital Signs:  Vitals:    08/06/21 1942   BP: (!) 153/98   Pulse: 93   Resp: 18   Temp: 98.2 °F (36.8 °C)   SpO2: 99%       Physical Exam  Vitals and nursing note reviewed. Constitutional:       General: He is not in acute distress. Appearance: Normal appearance. He is not toxic-appearing. HENT:      Head: Normocephalic and atraumatic. Right Ear: External ear normal.      Left Ear: External ear normal.      Nose: Nose normal.      Mouth/Throat:      Mouth: Mucous membranes are moist.      Pharynx: Oropharynx is clear. Eyes:      Conjunctiva/sclera: Conjunctivae normal.   Cardiovascular:      Rate and Rhythm: Normal rate. Pulses: Normal pulses. Pulmonary:      Effort: Pulmonary effort is normal. No respiratory distress. Abdominal:      General: Abdomen is flat. There is no distension. Musculoskeletal:         General: Normal range of motion. Cervical back: Normal range of motion and neck supple. No rigidity. No muscular tenderness. Comments: Patient has no significant right hip tenderness to palpation, no significant swelling of any of his right hip, right knee or ankle joints. No lateral or medial malleoli tenderness palpation. Neuro vas intact. Got good DP and PT pulses bilaterally. Is no pain is popliteal fossa. No crepitus noted. He is able to ambulate without difficulty. Lymphadenopathy:      Cervical: No cervical adenopathy. Skin:     General: Skin is warm and dry. Capillary Refill: Capillary refill takes less than 2 seconds. Coloration: Skin is not jaundiced. Neurological:      General: No focal deficit present. Mental Status: He is alert. Psychiatric:         Mood and Affect: Mood normal.         Behavior: Behavior normal.         MEDICAL DECISION MAKING   Initial Assessment: This is a 26-year-old male presents with right hip pain over the past 5 days he was seen in the emergency department on 8/4/2021 underwent an x-ray which were negative for any acute fractures. He comes in for persistent pain. He has no recent injuries or trauma last couple days. No fevers or chills. No erythema or redness of the knee joint. He mentions some occasional radiation to his right lower back. He has a positive straight leg test upon examination. Neurovascular intact.   Can ambulate without difficulty. Differential Diagnosis Included but not limited to: Muscle strain, contusion, sciatica, muscle spasm    MDM:   Patient was given a dose of Toradol here he was given prednisone by the former physician was 2 days ago, advised him to continue with this. He also given lidocaine patch as well as Tylenol ibuprofen prescriptions for discharge. Follow-up with a primary care physician for time as well as orthopedic instead of [address] [place] [address]. ED RESULTS   Laboratory results:  Labs Reviewed - No data to display    Radiologic studies results:  No orders to display       ED Medications administered this visit:   Medications   ketorolac (TORADOL) injection 30 mg (30 mg Intramuscular Given 8/6/21 2039)         ED COURSE      Strict return precautions and follow up instructions were discussed with the patient prior to discharge, with which the patient agrees. MEDICATION CHANGES     New Prescriptions    ACETAMINOPHEN (TYLENOL) 500 MG TABLET    Take 1 tablet by mouth 4 times daily as needed for Pain    IBUPROFEN (IBU) 600 MG TABLET    Take 1 tablet by mouth every 6 hours as needed for Pain    LIDOCAINE (LIDODERM) 5 %    Place 1 patch onto the skin daily for 10 days 12 hours on, 12 hours off. FINAL DISPOSITION     Final diagnoses:   Right leg pain     Condition: condition: good  Dispo: Discharge to home      This transcription was electronically signed. Parts of this transcriptions may have been dictated by use of voice recognition software and electronically transcribed, and parts may have been transcribed with the assistance of an ED scribe. The transcription may contain errors not detected in proofreading. Please refer to my supervising physician's documentation if my documentation differs.     Electronically Signed: Sandy Walton MD, 08/06/21, 8:49 PM         Sandy Walton MD  Resident  08/06/21 9106

## 2021-09-08 ENCOUNTER — HOSPITAL ENCOUNTER (OUTPATIENT)
Dept: PHYSICAL THERAPY | Age: 56
Setting detail: THERAPIES SERIES
Discharge: HOME | End: 2021-09-08
Payer: COMMERCIAL

## 2021-09-08 PROCEDURE — 97110 THERAPEUTIC EXERCISES: CPT

## 2021-09-08 PROCEDURE — 97161 PT EVAL LOW COMPLEX 20 MIN: CPT

## 2021-09-08 NOTE — PROGRESS NOTES
** PLEASE SIGN, DATE AND TIME CERTIFICATION BELOW AND RETURN TO [facility] OUTPATIENT REHABILITATION (FAX #: [phone]). ATTEST/CO-SIGN IF ACCESSING VIA [employer]. THANK YOU.**    I certify that I have examined the patient below and determined that Physical Medicine and Rehabilitation service is necessary and that I approve the established plan of care for up to 90 days or as specifically noted. Attestation, signature or co-signature of physician indicates approval of certification requirements.    ________________________ ____________ __________  Physician Signature   Date   Time  [address] [facility]  PHYSICAL THERAPY  [x] EVALUATION  [] DAILY NOTE (LAND) [] DAILY NOTE (AQUATIC ) [] PROGRESS NOTE [] DISCHARGE NOTE    [x] [facility]   [] [facility] 90    [] [facility]   [] Dulce Maria Amabil    Date: 2021  Patient Name:  Stephanie Loza  : 1965  MRN: [id number]  CSN: [id number]    Referring Practitioner Roberto Pavon MD   Diagnosis Pain in right hip [M25.551]  Low back pain [M54.5]    Treatment Diagnosis Pain in low back, pain in R hip, weakness in core, weakness in hip, decreased ROM   Date of Evaluation 21    Additional Pertinent History Unremarkable       Functional Outcome Measure Used Modified Oswestry    Functional Outcome Score 28/50 (21)       Insurance: Primary: Payor: Susan Kumar /  /  / ,   Secondary:    Authorization Information: PRE CERTIFICATION REQUIRED: NO   INSURANCE THERAPY BENEFIT:  NO VISIT LIMIT   AQUATIC THERAPY COVERED: YES  MODALITIES COVERED:  YES, NO MASSAGE   Visit # 1, 1/10 for progress note   Visits Allowed: No visit limit   Recertification Date:    Physician Follow-Up: 4 weeks   Physician Orders:    History of Present Illness: Patient reports that he has had low back and R hip pain for a couple months now. Patient denies any injury.  Patient reports that he went to ER and he had [facility] is [facility] [place]  [x] except ER ROM B      Knee Flexion      Knee Extension      Knee Range of [facility] is [facility]  [x]       LOWER EXTREMITY STRENGTH    Left Right Comments   Hip Flexion 4 4+    Hip Abduction 5 4-    Hip Extension 4+ 4    Knee Flexion 5 4+    Knee Extension 5 4+    Ankle DF 5 5    Ankle PF 5 5    LE strength is WFL []      CORE STRENGTH   B SLR TEST:         12 seconds demonstrating decreased core strength       SPECIAL TESTS (+/-)    Left Right Comments   SLR  - -    90/90 Hamstring length 20 deg 41 deg    SKTC - -    Slump - -    Hip scour - -    JIM - +    FADIR - +    Brown + +    Kevin + +    Ely + +          TREATMENT   Precautions:    Pain: 10/10 pain in low back, R hip    X in shaded column indicates activity completed today   Modalities Parameters/  Location  Notes                     Manual Therapy Time/Technique  Notes                     Exercise/Intervention   Notes   Supine ab brace   x Patient was educated on the following for HEP to address deficits. Patient requiring verbal and visual instruction for technique. HEP handout provided. Supine ab brace with SLR, march   x    PPT   x    Piriformis stretch figure 4 IR and ER   x    Cross body LE stretch   x    EOB hip flexor/quad stretch   x    Seated HS stretch   x                                  Specific Interventions Next Treatment: Core and hip strength, HS/quad/hip flexor/piriformis stretching, STM to R piriformis, LTR, modalities as needed. Activity/Treatment Tolerance:  [x]  Patient tolerated treatment well  []  Patient limited by fatigue  []  Patient limited by pain   []  Patient limited by medical complications  []  Other:     Assessment: 55year old male presents with low back and R hip pain secondary to arthritis. Probable R piriformis involvement.  Patient has pain in back, pain in R hip, decreased R hip ER ROM, decreased trunk ROM, decreased core and hip strength, and tightness throughout LEs limiting the patients ability to stand and walk to perform work tasks. Patient would benefit from skilled PT to improve pain, ROM, tissue extensibility, strength, and gait to allow improved functional mobility. Patient educated on benefit of PT and PT POC with patient in agreement. Body Structures/Functions/Activity Limitations: impaired ROM, impaired strength, pain, abnormal gait and abnormal posture  Prognosis: good      Goals    Patient Goal: to improve pain    Short Term Goals: 4 weeks  1. Patient will report decrease in pain to 5/10 at most to allow ease of work tasks. 2. Patient will improve trunk AROM to [facility] to allow ease of bending and lifting tasks. 3. Patient will perform B SLR for 25 seconds to improve core strength needed for ease of standing tasks. 4. Patient will improve 90/90 hamstring length to 20 degrees R to allow decreased pain with standing/walking. 5. Patient will have WFL B hip ER AROM to allow improved ease of getting in and out of car. Long Term Goals: 8 weeks  1. Patient will improve Modified Oswestry score from 28/50 to 18/50 to allow decrease in disability and improved functional mobility. 2. Patient will be independent with HEP in order to prevent re-injury and improve functional abilities. Patient Education:   [x]  HEP/Education Completed: Plan of Care, Goals, benefit of PT, attendance policy, HEP with handout provided   [employer] Access Code:  []  No new Education completed  []  Reviewed Prior HEP      [x]  Patient verbalized and/or demonstrated understanding of education provided. []  Patient unable to verbalize and/or demonstrate understanding of education provided. Will continue education.   []  Barriers to learning:     PLAN:  Treatment Recommendations: Strengthening, Range of Motion, Gait Training, Stair Training, Manual Therapy - Soft Tissue Mobilization, Manual Therapy - Joint Manipulation, Pain Management, Home Exercise Program, Patient Education, Aquatics and Modalities    [x]  Plan of care initiated. Plan to see patient 2 times per week for 8 weeks to address the treatment planned outlined above.   []  Continue with current plan of care  []  Modify plan of care as follows:    []  Hold pending physician visit  []  Discharge    Time In 1505   Time Out 1540   Timed Code Minutes: 8 min   Total Treatment Time: 35 min     Electronically Signed by: Josué Randhawa PT

## 2021-09-10 ENCOUNTER — HOSPITAL ENCOUNTER (OUTPATIENT)
Dept: PHYSICAL THERAPY | Age: 56
Setting detail: THERAPIES SERIES
Discharge: HOME | End: 2021-09-10
Payer: COMMERCIAL

## 2021-09-10 PROCEDURE — 97110 THERAPEUTIC EXERCISES: CPT

## 2021-09-10 NOTE — PROGRESS NOTES
[address] [facility]  PHYSICAL THERAPY  [x] EVALUATION  [] DAILY NOTE (LAND) [] DAILY NOTE (AQUATIC ) [] PROGRESS NOTE [] DISCHARGE NOTE    [x] OUTPATIENT REHABILITATION CENTER [facility]   [] [place] [address]    [] [facility]   [facility]    Date: 9/10/2021  Patient Name:  Magui Santiago  : 1965  MRN: [id number]  CSN: [id number]    Referring Practitioner Jayson Kurtz MD   Diagnosis Pain in right hip [M25.551]  Low back pain [M54.5]    Treatment Diagnosis Pain in low back, pain in R hip, weakness in core, weakness in hip, decreased ROM   Date of Evaluation 21    Additional Pertinent History Unremarkable       Functional Outcome Measure Used Modified Oswestry    Functional Outcome Score 28/50 (21)       Insurance: Primary: Payor: Nuvia Danielle /  /  / ,   Secondary:    Authorization Information: PRE CERTIFICATION REQUIRED: NO   INSURANCE THERAPY BENEFIT:  NO VISIT LIMIT   AQUATIC THERAPY COVERED: YES  MODALITIES COVERED:  YES, NO MASSAGE   Visit # 2, 2/10 for progress note   Visits Allowed: No visit limit   Recertification Date: 53/3/11   Physician Follow-Up: 4 weeks   Physician Orders:    History of Present Illness: Patient reports that he has had low back and R hip pain for a couple months now. Patient denies any injury. Patient reports that he went to ER and he had x-rays done that showed arthritis in R hip and low back. Patient denies numbness/tingling. SUBJECTIVE: Patient reports that pain will radiate down R leg into knee. Patient feels pain is coming from his right hip. Pain level walking back into clinic was present more in his right hip today. Just a tweeking type pain.  Low back pain comes and goes      TREATMENT   Precautions:    Pain: Low back 0-2/10 today, R hip    X in shaded column indicates activity completed today   Modalities Parameters/  Location  Notes   Moist gel heat pack  20 minutes x During supinelying exercises Manual Therapy Time/Technique  Notes                     Exercise/Intervention   Notes   Supine ab brace   x Patient was educated on the following for HEP to address deficits. Patient requiring verbal and visual instruction for technique. HEP handout provided. Supine ab brace , march 10x  x    PPT 10x  x    Piriformis stretch figure 4 IR and ER, SKTC  3x Hold 10 count x    Cross body LE stretch   x    EOB hip flexor/quad stretch       Seated HS stretch 3x 10 count hold x           Core control: hip adduction with ball between knees 10x  x    Core control hip abduction in hooklying with resistance band single, bilateral 10x  x    Bridges 10x  x                                                Specific Interventions Next Treatment: Core and hip strength, HS/quad/hip flexor/piriformis stretching, STM to R piriformis, LTR, modalities as needed. Activity/Treatment Tolerance:  [x]  Patient tolerated treatment well  []  Patient limited by fatigue  []  Patient limited by pain   []  Patient limited by medical complications  []  Other:     Assessment: Progressed patient in core control ex today in hooklying, strengthening of hip musculature add, abductors, hip extensors. Flexibility program for piriformis, low back regions. Used moist heat while in hooklying position with exercises. HP placed at right hip and low back. Body Structures/Functions/Activity Limitations: impaired ROM, impaired strength, pain, abnormal gait and abnormal posture  Prognosis: good      Goals    Patient Goal: to improve pain    Short Term Goals: 4 weeks  1. Patient will report decrease in pain to 5/10 at most to allow ease of work tasks. 2. Patient will improve trunk AROM to [facility] to allow ease of bending and lifting tasks. 3. Patient will perform B SLR for 25 seconds to improve core strength needed for ease of standing tasks.   4. Patient will improve 90/90 hamstring length to 20 degrees R to allow decreased pain with

## 2021-09-20 ENCOUNTER — HOSPITAL ENCOUNTER (OUTPATIENT)
Dept: PHYSICAL THERAPY | Age: 56
Setting detail: THERAPIES SERIES
Discharge: HOME | End: 2021-09-20
Payer: COMMERCIAL

## 2021-09-20 PROCEDURE — 97110 THERAPEUTIC EXERCISES: CPT

## 2021-09-20 NOTE — PROGRESS NOTES
treatment. Manual Therapy Time/Technique  Notes                     Exercise/Intervention   Notes   Supine ab brace    Patient was educated on the following for HEP to address deficits. Patient requiring verbal and visual instruction for technique. HEP handout provided. Supine ab brace, march 10x  X 5 sec hold with abdominal bracing. PPT 10x 5 sec X    Piriformis stretch figure 4 IR and ER, SKTC  3x B 15 sec  X    Cross body LE stretch       EOB hip flexor/quad stretch 3x B  15 sec  X    LTR  3x B  15 sec X    PPT with SLR 10x  X    Core control: hip adduction with ball between knees 10x  X    Core control hip abduction in hooklying with orange  resistance band single, bilateral 10x  X    Bridges 10x 5 sec  X    PPT with alternating UE, combo alternating UE/LE 10x  X    Side lying: clamshells, reverse clamshells  10x B   X    Seated:        Seated HS stretch  3x 15 sec X    Marches 10x  X    LAQ 10x 5 sec  X    HS with orange theraband 10x  X             Specific Interventions Next Treatment: Core and hip strength, HS/quad/hip flexor/piriformis stretching, STM to R piriformis, LTR, modalities as needed. Activity/Treatment Tolerance:  [x]  Patient tolerated treatment well  []  Patient limited by fatigue  []  Patient limited by pain   []  Patient limited by medical complications  []  Other:     Assessment: Added therapeutic exercises as documented above. He was provided with demonstration and cues on proper technique with added exercises to ensure maximal muscle activation. Cues also provided for abdominal bracing while performing exercises. He noted some soreness at his right hip at the conclusion of session but reported that it was not too bad. Body Structures/Functions/Activity Limitations: impaired ROM, impaired strength, pain, abnormal gait and abnormal posture  Prognosis: good    Goals    Patient Goal: to improve pain    Short Term Goals: 4 weeks  1.  Patient will report decrease in pain to 5/10 at most to allow ease of work tasks. 2. Patient will improve trunk AROM to [facility] to allow ease of bending and lifting tasks. 3. Patient will perform B SLR for 25 seconds to improve core strength needed for ease of standing tasks. 4. Patient will improve 90/90 hamstring length to 20 degrees R to allow decreased pain with standing/walking. 5. Patient will have WFL B hip ER AROM to allow improved ease of getting in and out of car. Long Term Goals: 8 weeks  1. Patient will improve Modified Oswestry score from 28/50 to 18/50 to allow decrease in disability and improved functional mobility. 2. Patient will be independent with HEP in order to prevent re-injury and improve functional abilities. Patient Education:   [x]  HEP/Education Completed: Added therapeutic exercises as documented above. Patient was provided with handouts of updated HEP. Educated on performing HEP daily. [employer] Access Code: [email or web address]  []  No new Education completed  [x]  Reviewed Prior HEP      [x]  Patient verbalized and/or demonstrated understanding of education provided. []  Patient unable to verbalize and/or demonstrate understanding of education provided. Will continue education. []  Barriers to learning:     PLAN:  Treatment Recommendations: Strengthening, Range of Motion, Gait Training, Stair Training, Manual Therapy - Soft Tissue Mobilization, Manual Therapy - Joint Manipulation, Pain Management, Home Exercise Program, Patient Education, Aquatics and Modalities    []  Plan of care initiated. Plan to see patient 2 times per week for 8 weeks to address the treatment planned outlined above.   [x]  Continue with current plan of care  []  Modify plan of care as follows:    []  Hold pending physician visit  []  Discharge    Time In 1616   Time Out 1655   Timed Code Minutes: 39 min   Total Treatment Time: 39 min     Electronically Signed by: Derrick Mckeon PTA

## 2021-09-23 ENCOUNTER — HOSPITAL ENCOUNTER (OUTPATIENT)
Dept: PHYSICAL THERAPY | Age: 56
Setting detail: THERAPIES SERIES
Discharge: HOME | End: 2021-09-23
Payer: COMMERCIAL

## 2021-09-23 PROCEDURE — 97110 THERAPEUTIC EXERCISES: CPT

## 2021-09-23 NOTE — PROGRESS NOTES
[address] [facility]  PHYSICAL THERAPY  [] EVALUATION  [x] DAILY NOTE (LAND) [] DAILY NOTE (AQUATIC ) [] PROGRESS NOTE [] DISCHARGE NOTE    [x] OUTPATIENT REHABILITATION CENTER [facility]   [] [place] [address]    [] [facility]   [] Jadon Lozano     Date: 2021  Patient Name:  Romy Dyson \"Home\"   : 1965  MRN: [id number]  CSN: [id number]    Referring Practitioner Masha Allen MD   Diagnosis Pain in right hip [M25.551]  Low back pain [M54.5]    Treatment Diagnosis Pain in low back, pain in R hip, weakness in core, weakness in hip, decreased ROM   Date of Evaluation 21    Additional Pertinent History Unremarkable       Functional Outcome Measure Used Modified Oswestry    Functional Outcome Score 28/50 (21)       Insurance: Primary: Payor: Acacia Guy /  /  / ,   Secondary:    Authorization Information: PRE CERTIFICATION REQUIRED: NO   INSURANCE THERAPY BENEFIT:  NO VISIT LIMIT   AQUATIC THERAPY COVERED: YES  MODALITIES COVERED:  YES, NO MASSAGE   Visit # 3, 3/10 for progress note   Visits Allowed: No visit limit   Recertification Date: 64   Physician Follow-Up: 4 weeks   Physician Orders:    History of Present Illness: Patient reports that he has had low back and R hip pain for a couple months now. Patient denies any injury. Patient reports that he went to ER and he had x-rays done that showed arthritis in R hip and low back. Patient denies numbness/tingling. SUBJECTIVE:Pain levels are better since starting therapy. Pt stated he has been off of work for 3 weeks due to pain. Pt works at [employer].      TREATMENT   Precautions:    Pain: 4-5/10 R hip    X in shaded column indicates activity completed today   Modalities Parameters/  Location  Notes   Moist gel heat pack  20 minutes X During supinelying exercises                Manual Therapy Time/Technique  Notes                     Exercise/Intervention   Notes   Supine ab

## 2021-09-27 ENCOUNTER — HOSPITAL ENCOUNTER (OUTPATIENT)
Dept: PHYSICAL THERAPY | Age: 56
Setting detail: THERAPIES SERIES
Discharge: HOME | End: 2021-09-27
Payer: COMMERCIAL

## 2021-09-27 PROCEDURE — 97110 THERAPEUTIC EXERCISES: CPT

## 2021-09-27 NOTE — PROGRESS NOTES
[address] [facility]  PHYSICAL THERAPY  [] EVALUATION  [x] DAILY NOTE (LAND) [] DAILY NOTE (AQUATIC ) [] PROGRESS NOTE [] DISCHARGE NOTE    [x] OUTPATIENT REHABILITATION CENTER [facility]   [] [place] [address]    [] [facility]   [] Agustina Boyle     Date: 2021  Patient Name:  Erik Nowak \"Home\"   : 1965  MRN: [id number]  CSN: [id number]    Referring Practitioner Jasbir Pak MD   Diagnosis Pain in right hip [M25.551]  Low back pain [M54.5]    Treatment Diagnosis Pain in low back, pain in R hip, weakness in core, weakness in hip, decreased ROM   Date of Evaluation 21    Additional Pertinent History Unremarkable       Functional Outcome Measure Used Modified Oswestry    Functional Outcome Score 28/50 (21)       Insurance: Primary: Payor: Rodol[employer] Bonds /  /  / ,   Secondary:    Authorization Information: PRE CERTIFICATION REQUIRED: NO   INSURANCE THERAPY BENEFIT:  NO VISIT LIMIT   AQUATIC THERAPY COVERED: YES  MODALITIES COVERED:  YES, NO MASSAGE   Visit # 4, 4/10 for progress note   Visits Allowed: No visit limit   Recertification Date:    Physician Follow-Up: 4 weeks   Physician Orders:    History of Present Illness: Patient reports that he has had low back and R hip pain for a couple months now. Patient denies any injury. Patient reports that he went to ER and he had x-rays done that showed arthritis in R hip and low back. Patient denies numbness/tingling. SUBJECTIVE:Patient reports of continuing to be off work. Exercises going okay. Does not do as much ex as he should. Reports of mainly soreness at his right hip. Back is doing better. No pain number given today \"just soreness\" Since coming to therapy sleeping better at night. Less hip discomfort. Planning MRI from Dr. Poly Abrams. Needs to be scheduled yet.      TREATMENT   Precautions:    Pain: 4-5/10 R hip    X in shaded column indicates activity completed today   Modalities Parameters/  Location  Notes   Moist gel heat pack  20 minutes X During supinelying exercises                Manual Therapy Time/Technique  Notes                     Exercise/Intervention   Notes   Supine ab brace    Patient was educated on the following for HEP to address deficits. Patient requiring verbal and visual instruction for technique. HEP handout provided. Supine ab brace, march 10x  x 5 sec hold with abdominal bracing. PPT 10x 5 sec x    Piriformis stretch figure 4 IR and ER, SKTC  3x B 15 sec  X    Cross body LE stretch       EOB hip flexor/quad stretch 3x B  15 sec  x    LTR  3x B  15 sec     PPT with SLR 10x  x    Core control: hip adduction with ball between knees 10x  x    Core control hip abduction in hooklying with orange  resistance band single, bilateral 10x  x    Bridges with hip adduction ball between knees. 10x 5 sec  x    PPT with alternating UE, marches with LE,  combo alternating UE/LE 10x  x Reciprocal marches only due to difficulty keeping pelvis level and in slight posterior tilt. Side lying: clamshells, reverse clamshells  15x B   x    Seated: blue cushion in chair for seated ex       Seated HS stretch  3x 15 sec     Marches 15x  x    LAQ 15x 5 sec  x    HS with orange theraband 15x  x    Sit to stands 10x  x No UE support     Specific Interventions Next Treatment: Core and hip strength, HS/quad/hip flexor/piriformis stretching, STM to R piriformis, LTR, modalities as needed. Activity/Treatment Tolerance:  [x]  Patient tolerated treatment well  []  Patient limited by fatigue  []  Patient limited by pain   []  Patient limited by medical complications  []  Other:     Assessment: Patient reports of 0/10 back pain. Relates right hip soreness a little bit. Ex per patient care record. Cues for keeping pelvis level with core control ex. Noting pain and soreness with right SLR today. Ex modified accordingly. Will continue with progression of exercises to manage back and right hip. Body Structures/Functions/Activity Limitations: impaired ROM, impaired strength, pain, abnormal gait and abnormal posture  Prognosis: good    Goals    Patient Goal: to improve pain    Short Term Goals: 4 weeks  1. Patient will report decrease in pain to 5/10 at most to allow ease of work tasks. 2. Patient will improve trunk AROM to [facility] to allow ease of bending and lifting tasks. 3. Patient will perform B SLR for 25 seconds to improve core strength needed for ease of standing tasks. 4. Patient will improve 90/90 hamstring length to 20 degrees R to allow decreased pain with standing/walking. 5. Patient will have WFL B hip ER AROM to allow improved ease of getting in and out of car. Long Term Goals: 8 weeks  1. Patient will improve Modified Oswestry score from 28/50 to 18/50 to allow decrease in disability and improved functional mobility. 2. Patient will be independent with HEP in order to prevent re-injury and improve functional abilities. Patient Education:    [x]  HEP/Education Completed: Continue with HEP. Lawyer Mae Dale Access Code: [email or web address]  []  No new Education completed  [x]  Reviewed Prior HEP      [x]  Patient verbalized and/or demonstrated understanding of education provided. []  Patient unable to verbalize and/or demonstrate understanding of education provided. Will continue education. []  Barriers to learning:     PLAN:  Treatment Recommendations: Strengthening, Range of Motion, Gait Training, Stair Training, Manual Therapy - Soft Tissue Mobilization, Manual Therapy - Joint Manipulation, Pain Management, Home Exercise Program, Patient Education, Aquatics and Modalities    []  Plan of care initiated. Plan to see patient 2 times per week for 8 weeks to address the treatment planned outlined above.   [x]  Continue with current plan of care  []  Modify plan of care as follows:    []  Hold pending physician visit  []  Discharge    Time In 1022   Time Out 1055   Timed Code Minutes: 33 Total Treatment Time: 33     Electronically Signed by: Anabelle Argueta PT

## 2021-09-29 ENCOUNTER — HOSPITAL ENCOUNTER (OUTPATIENT)
Dept: PHYSICAL THERAPY | Age: 56
Setting detail: THERAPIES SERIES
Discharge: HOME | End: 2021-09-29
Payer: COMMERCIAL

## 2021-09-29 PROCEDURE — 97110 THERAPEUTIC EXERCISES: CPT

## 2021-09-29 NOTE — PROGRESS NOTES
[address] [facility]  PHYSICAL THERAPY  [] EVALUATION  [x] DAILY NOTE (LAND) [] DAILY NOTE (AQUATIC ) [] PROGRESS NOTE [] DISCHARGE NOTE    [x] OUTPATIENT REHABILITATION CENTER [facility]   [] [place] [address]    [] [facility]   [] Jadon Lozano     Date: 2021  Patient Name:  Romy Dyson \"Home\"   : 1965  MRN: [id number]  CSN: [id number]    Referring Practitioner Masha Allen MD   Diagnosis Pain in right hip [M25.551]  Low back pain [M54.5]    Treatment Diagnosis Pain in low back, pain in R hip, weakness in core, weakness in hip, decreased ROM   Date of Evaluation 21    Additional Pertinent History Unremarkable       Functional Outcome Measure Used Modified Oswestry    Functional Outcome Score 28/50 (21)       Insurance: Primary: Payor: Acacia Guy /  /  / ,   Secondary:    Authorization Information: PRE CERTIFICATION REQUIRED: NO   INSURANCE THERAPY BENEFIT:  NO VISIT LIMIT   AQUATIC THERAPY COVERED: YES  MODALITIES COVERED:  YES, NO MASSAGE   Visit # 5, 5/10 for progress note   Visits Allowed: No visit limit   Recertification Date:    Physician Follow-Up: 4 weeks   Physician Orders:    History of Present Illness: Patient reports that he has had low back and R hip pain for a couple months now. Patient denies any injury. Patient reports that he went to ER and he had x-rays done that showed arthritis in R hip and low back. Patient denies numbness/tingling. Ramez Friedman reports that right hip continues to be sore 7/10 pain level rating. Note pain at rest when he lays down and when he is standing. Notes mainly nighttime pain at his right hip.  Patient taking prescription medicine     TREATMENT   Precautions:    Pain: 7/10 R hip    X in shaded column indicates activity completed today   Modalities Parameters/  Location  Notes   Moist gel heat pack  20 minutes X During supinelying exercises                Manual Therapy Time/Technique  Notes                     Exercise/Intervention   Notes   Supine ab brace    Patient was educated on the following for HEP to address deficits. Patient requiring verbal and visual instruction for technique. HEP handout provided. Supine ab brace, march 10x  x 5 sec hold with abdominal bracing. PPT 10x 5 sec x    Piriformis stretch figure 4 IR and ER, SKTC  3x B 15 sec  X    Cross body LE stretch       EOB hip flexor/quad stretch 3x B  15 sec   Did lunge stretch on step 9/29/2021   LTR  5x B  15 sec     PPT with SLR 10x  x    Core control: hip adduction with ball between knees 10x      Core control hip abduction in hooklying with orange  resistance band single, bilateral 15x  x    Bridges with hip adduction ball between knees. 10x 5 sec  x    PPT with alternating UE, marches with LE,  combo alternating UE/LE 10x   Reciprocal marches only due to difficulty keeping pelvis level and in slight posterior tilt. Side lying: clamshells with orange band, reverse clamshells  15x B   x    Seated: blue cushion in chair for seated ex       Seated HS stretch  3x 15 sec  Did hamstring stretch at step today 9/29/2021   Marches 15x  x    LAQ squeezing ball between thighs. 15x 5 sec  x    HS with orange theraband 15x  x    Seated hip abduction/adduction with resistance band x15  orange x                                       Hip flexor lunge stretch on step right/left  3x 15 count x    Hamstring stretch at step right/left  3x 15 count x    Sit to stands 10x  x No UE support     Specific Interventions Next Treatment: Core and hip strength, HS/quad/hip flexor/piriformis stretching, STM to R piriformis, LTR, modalities as needed. Activity/Treatment Tolerance:  [x]  Patient tolerated treatment well  []  Patient limited by fatigue  []  Patient limited by pain   []  Patient limited by medical complications  []  Other:     Assessment: Patient reports of 0/10 back pain.  Relates right hip soreness persists pointing

## 2021-10-05 ENCOUNTER — HOSPITAL ENCOUNTER (OUTPATIENT)
Dept: PHYSICAL THERAPY | Age: 56
Setting detail: THERAPIES SERIES
Discharge: HOME | End: 2021-10-05
Payer: COMMERCIAL

## 2021-10-05 PROCEDURE — 97110 THERAPEUTIC EXERCISES: CPT

## 2021-10-05 NOTE — PROGRESS NOTES
[address] [facility]  PHYSICAL THERAPY  [] EVALUATION  [] DAILY NOTE (LAND) [] DAILY NOTE (AQUATIC ) [x] PROGRESS NOTE [] DISCHARGE NOTE    [x] OUTPATIENT REHABILITATION CENTER [facility]   [] [place] [address]    [] [facility]   [] Jordan Balbuena     Date: 10/5/2021  Patient Name:  Katherine Gómez \"Home\"   : 1965  MRN: [id number]  CSN: [id number]    Referring Practitioner Jay Mendoza MD   Diagnosis Pain in right hip [M25.551]  Low back pain [M54.5]    Treatment Diagnosis Pain in low back, pain in R hip, weakness in core, weakness in hip, decreased ROM   Date of Evaluation 21    Additional Pertinent History Unremarkable       Functional Outcome Measure Used Modified Oswestry    Functional Outcome Score 28/50 (21)       Insurance: Primary: Payor: Patricia Garay /  /  / ,   Secondary:    Authorization Information: PRE CERTIFICATION REQUIRED: NO   INSURANCE THERAPY BENEFIT:  NO VISIT LIMIT   AQUATIC THERAPY COVERED: YES  MODALITIES COVERED:  YES, NO MASSAGE   Visit # 7, 0/10 for progress note   Visits Allowed: No visit limit   Recertification Date:    Physician Follow-Up: 4 weeks   Physician Orders:    History of Present Illness: Patient reports that he has had low back and R hip pain for a couple months now. Patient denies any injury. Patient reports that he went to ER and he had x-rays done that showed arthritis in R hip and low back. Patient denies numbness/tingling. Michelle Francis reports that therapy has helped some with his pain and soreness and he has seen improvement with therapy. Patient reports that he continues to have pain.     TREATMENT   Precautions:    Pain: 7/10 R hip    X in shaded column indicates activity completed today   Modalities Parameters/  Location  Notes   Moist gel heat pack  20 minutes X During supinelying exercises                Manual Therapy Time/Technique  Notes                     Exercise/Intervention Notes   Supine ab brace    Patient was educated on the following for HEP to address deficits. Patient requiring verbal and visual instruction for technique. HEP handout provided. Supine ab brace, march 10x  x 5 sec hold with abdominal bracing. PPT 10x 5 sec x    Piriformis stretch figure 4 IR and ER, SKTC  3x B 15 sec  X    Cross body LE stretch       EOB hip flexor/quad stretch 3x B  15 sec   Did lunge stretch on step 9/29/2021   LTR  5x B  15 sec     PPT with SLR 10x  x    Core control: hip adduction with ball between knees 15x 5\" x    Core control hip abduction in hooklying with orange  resistance band single, bilateral 15x      Ab brace with knee fall out 10 x  each x    Bridges with hip adduction ball between knees. 10x 5 sec  x    Supine EOB hip flexor stretch 3 x 30 sec x    PPT with alternating UE, marches with LE,  combo alternating UE/LE 10x  x March only    Side lying: clamshells with orange band, reverse clamshells  15x B   x    Seated: blue cushion in chair for seated ex       Seated HS stretch  3x 15 sec  Did hamstring stretch at step today 9/29/2021   Marches 15x      LAQ squeezing ball between thighs. 15x 5 sec      HS with orange theraband 15x      Seated hip abduction/adduction with resistance band x15  orange                                        Hip flexor lunge stretch on step right/left  3x 15 count x    Hamstring stretch at step right/left  3x 15 count x    Sit to stands 10x   No UE support     Specific Interventions Next Treatment: Core and hip strength, HS/quad/hip flexor/piriformis stretching, STM to R piriformis, LTR, modalities as needed. Activity/Treatment Tolerance:  [x]  Patient tolerated treatment well  []  Patient limited by fatigue  []  Patient limited by pain   []  Patient limited by medical complications  []  Other:     Assessment: Patient seen for progress report today. Patient has been seen for 7 visits at this time. Patient has made some progress with therapy. Patient improved hamstring length on R from 41 to 29 degrees. Patient did improve B hip ER ROM to 30 degrees B. Patient improved trunk ROM. Patient scoring the same on B SLR test today. Patient has demonstrating improved core strength with patient able to perform an abdominal contraction with good form today. Patient scoring about the same on Modified Oswestry today. Patient does continue to have pain in R hip, decreased trunk and hip ROM, weakness in core and hip, and tightness in R hamstring limiting the patients ability to perform work tasks. Patient would benefit from continued skilled PT to further improve functional mobility and address deficits mentioned above. Plan to see patient 2 times per week for an additional 4 weeks. Body Structures/Functions/Activity Limitations: impaired ROM, impaired strength, pain, abnormal gait and abnormal posture  Prognosis: good    Goals    Patient Goal: to improve pain    Short Term Goals: 4 weeks  1. Patient will report decrease in pain to 5/10 at most to allow ease of work tasks. GOAL NOT MET: 7-8/10 pain today in R hip. Continue Goal  2. Patient will improve trunk AROM to [facility] to allow ease of bending and lifting tasks. GOAL NOT MET: Trunk flexion WFL, lateral flexion 25% limited R, rotation and extension WFLs. Continue Goal  3. Patient will perform B SLR for 25 seconds to improve core strength needed for ease of standing tasks. GOAL NOT MET: B SLR 12 seconds. Continue Goal  4. Patient will improve 90/90 hamstring length to 20 degrees R to allow decreased pain with standing/walking. GOAL NOT MET: R HS length 29 degrees. Continue Goal  5. Patient will have WFL B hip ER AROM to allow improved ease of getting in and out of car. GOAL NOT MET: B hip ER 30 degrees. Continue Goal   6. Patient will improve B hip strength to 5/5 to allow ease of walking and daily tasks. NEW GOAL    Long Term Goals: 8 weeks  1.  Patient will improve Modified Oswestry score from 28/50 to

## 2021-10-06 ENCOUNTER — HOSPITAL ENCOUNTER (OUTPATIENT)
Dept: MRI IMAGING | Age: 56
Discharge: HOME | End: 2021-10-06
Payer: COMMERCIAL

## 2021-10-06 DIAGNOSIS — M25.551 RIGHT HIP PAIN: ICD-10-CM

## 2021-10-06 PROCEDURE — 73721 MRI JNT OF LWR EXTRE W/O DYE: CPT

## 2021-10-07 ENCOUNTER — HOSPITAL ENCOUNTER (OUTPATIENT)
Dept: PHYSICAL THERAPY | Age: 56
Setting detail: THERAPIES SERIES
Discharge: HOME | End: 2021-10-07
Payer: COMMERCIAL

## 2021-10-07 PROCEDURE — 97110 THERAPEUTIC EXERCISES: CPT

## 2021-10-07 NOTE — PROGRESS NOTES
[address] [facility]  PHYSICAL THERAPY  [] EVALUATION  [x] DAILY NOTE (LAND) [] DAILY NOTE (AQUATIC ) [] PROGRESS NOTE [] DISCHARGE NOTE    [x] OUTPATIENT REHABILITATION CENTER [facility]   [] [place] [address]    [] [facility]   [] Shelby Galvan       Date: 10/7/2021  Patient Name:  Cody Walden \"Home\"   : 1965  MRN: [id number]  CSN: [id number]    Referring Practitioner Jaye Adrian MD   Diagnosis Pain in right hip [M25.551]  Low back pain [M54.5]    Treatment Diagnosis Pain in low back, pain in R hip, weakness in core, weakness in hip, decreased ROM   Date of Evaluation 21    Additional Pertinent History Unremarkable       Functional Outcome Measure Used Modified Oswestry    Functional Outcome Score 28/50 (21)       Insurance: Primary: Payor: Ashlie Brandon /  /  / ,   Secondary:    Authorization Information: PRE CERTIFICATION REQUIRED: NO   INSURANCE THERAPY BENEFIT:  NO VISIT LIMIT   AQUATIC THERAPY COVERED: YES  MODALITIES COVERED:  YES, NO MASSAGE   Visit # 8, 1/10 for progress note   Visits Allowed: No visit limit   Recertification Date:    Physician Follow-Up: 4 weeks   Physician Orders:    History of Present Illness: Patient reports that he has had low back and R hip pain for a couple months now. Patient denies any injury. Patient reports that he went to ER and he had x-rays done that showed arthritis in R hip and low back. Patient denies numbness/tingling. SUBJECTIVE: Patient reports that he is having 7/10 pain at his right hip again today. He reports that his pain also comes at the front and back of his thigh on his right leg and at his lower back. He reports that he occasional performs his exercises at home. TREATMENT   Precautions:    Pain: 7/10 R hip    X in shaded column indicates activity completed today   Modalities Parameters/  Location  Notes   Moist gel heat pack  20 minutes X During supinelying exercises.  Skin intact pre and post treatment. Manual Therapy Time/Technique  Notes                     Exercise/Intervention   Notes   Supine ab brace    Patient was educated on the following for HEP to address deficits. Patient requiring verbal and visual instruction for technique. HEP handout provided. Supine ab brace, march 10x   5 sec hold with abdominal bracing. PPT 10x 5 sec X Cues for proper technique   Piriformis stretch figure 4 IR and ER, SKTC  3x B 15 sec  X Performed piriformis stretch only today. Cross body LE stretch       EOB hip flexor/quad stretch 3x B  15 sec   Did lunge stretch on step 9/29/2021   LTR  5x B  15 sec     PPT with SLR 15x  X    Core control: hip adduction with ball between knees 15x 5\" X    Core control hip abduction in hooklying with orange  resistance band single, bilateral 15x      Ab brace with knee fall out (bilateral, unilateral) with green theraband  10 x  each X    Bridges with hip adduction ball between knees. 15x 5 sec  X    Supine EOB hip flexor stretch 3 x 30 sec X    PPT with alternating UE, marches with LE,  combo alternating UE/LE 15x  X    Side lying: clamshells with green band, reverse clamshells  15x B   X    Seated: blue cushion in chair for seated ex       Seated HS stretch  3x 15 sec  Did hamstring stretch at step today 9/29/2021   Marches 15x      LAQ squeezing ball between thighs. 15x 5 sec  X    HS with orange theraband 15x      Seated hip abduction/adduction with resistance band x15  orange                                        Hip flexor lunge stretch on step right/left  3x 15 count X    Hamstring stretch at step right/left  3x 15 count X    Sit to stands 10x  X No UE support     Specific Interventions Next Treatment: Core and hip strength, HS/quad/hip flexor/piriformis stretching, STM to R piriformis, LTR, modalities as needed.     Activity/Treatment Tolerance:  [x]  Patient tolerated treatment well  []  Patient limited by fatigue  []  Patient limited by Recommendations: Strengthening, Range of Motion, Gait Training, Stair Training, Manual Therapy - Soft Tissue Mobilization, Manual Therapy - Joint Manipulation, Pain Management, Home Exercise Program, Patient Education, Aquatics and Modalities    []  Plan of care initiated. Plan to see patient 2 times per week for 8 weeks to address the treatment planned outlined above.   [x]  Continue with current plan of care  []  Modify plan of care as follows:    []  Hold pending physician visit  []  Discharge    Time In 1415   Time Out 1444   Timed Code Minutes: 29 min   Total Treatment Time: 29 min     Electronically Signed by: Ann Khoury PTA

## 2021-10-11 ENCOUNTER — APPOINTMENT (OUTPATIENT)
Dept: PHYSICAL THERAPY | Age: 56
End: 2021-10-11
Payer: COMMERCIAL

## 2021-10-14 ENCOUNTER — HOSPITAL ENCOUNTER (OUTPATIENT)
Dept: PHYSICAL THERAPY | Age: 56
Setting detail: THERAPIES SERIES
End: 2021-10-14
Payer: COMMERCIAL

## 2021-10-18 ENCOUNTER — HOSPITAL ENCOUNTER (OUTPATIENT)
Dept: PHYSICAL THERAPY | Age: 56
Setting detail: THERAPIES SERIES
Discharge: HOME | End: 2021-10-18
Payer: COMMERCIAL

## 2021-10-18 PROCEDURE — 97110 THERAPEUTIC EXERCISES: CPT

## 2021-10-18 NOTE — PROGRESS NOTES
[facility]  PHYSICAL THERAPY  [] EVALUATION  [x] DAILY NOTE (LAND) [] DAILY NOTE (AQUATIC ) [] PROGRESS NOTE [] DISCHARGE NOTE    [x] OUTPATIENT REHABILITATION [facility]   [] [place] [address]    [] [facility]   [] Darryl [place]       Date: 10/18/2021  Patient Name:  Chip Leone \"Home\"   : 1965  MRN: [id number]  CSN: [id number]    Referring Practitioner Cheryl Bob MD   Diagnosis Pain in right hip [M25.551]  Low back pain [M54.5]    Treatment Diagnosis Pain in low back, pain in R hip, weakness in core, weakness in hip, decreased ROM   Date of Evaluation 21    Additional Pertinent History Unremarkable       Functional Outcome Measure Used Modified Oswestry    Functional Outcome Score 28/50 (21)       Insurance: Primary: Payor: [employer] [place] /  /  / ,   Secondary:    Authorization Information: PRE CERTIFICATION REQUIRED: NO   INSURANCE THERAPY BENEFIT:  NO VISIT LIMIT   AQUATIC THERAPY COVERED: YES  MODALITIES COVERED:  YES, NO MASSAGE   Visit # 9, 2/10 for progress note   Visits Allowed: No visit limit   Recertification Date:    Physician Follow-Up: 4 weeks   Physician Orders:    History of Present Illness: Patient reports that he has had low back and R hip pain for a couple months now. Patient denies any injury. Patient reports that he went to ER and he had x-rays done that showed arthritis in R hip and low back. Patient denies numbness/tingling. SUBJECTIVE: Patient states he saw the doctor and he was told the hip pain is coming from his back. Reports he is feeling better now than he did before starting therapy. Patient is working on exercises at home. TREATMENT   Precautions:    Pain: 5-6/10 Right hip    X in shaded column indicates activity completed today   Modalities Parameters/  Location  Notes   Moist gel heat pack  20 minutes X During supinelying exercises. Skin intact pre and post treatment. fatigue  []  Patient limited by pain   []  Patient limited by medical complications  []  Other:     Assessment:  Progressed to standing activities today with fairly good tolerance. Mild increased Right hip soreness noted with standing exercises. Multiple cues needed for correct technique and proper muscle engagement with posterior pelvic tilts. Tightness noted at Right hip with stretches today. Goals    Patient Goal: to improve pain    Short Term Goals: 4 weeks  1. Patient will report decrease in pain to 5/10 at most to allow ease of work tasks. 2. Patient will improve trunk AROM to [facility] to allow ease of bending and lifting tasks. 3. Patient will perform B SLR for 25 seconds to improve core strength needed for ease of standing tasks. 4. Patient will improve 90/90 hamstring length to 20 degrees R to allow decreased pain with standing/walking. 5. Patient will have WFL B hip ER AROM to allow improved ease of getting in and out of car. 6. Patient will improve B hip strength to 5/5 to allow ease of walking and daily tasks. Long Term Goals: 8 weeks  1. Patient will improve Modified Oswestry score from 28/50 to 18/50 to allow decrease in disability and improved functional mobility. 2. Patient will be independent with HEP in order to prevent re-injury and improve functional abilities. Patient Education:   [x]  HEP/Education Completed: Correct exercise technique. Monitor pain after standing activities.  [employer] Access Code: [email or web address]  []  No new Education completed  [x]  Reviewed Prior HEP      [x]  Patient verbalized and/or demonstrated understanding of education provided. []  Patient unable to verbalize and/or demonstrate understanding of education provided. Will continue education.   []  Barriers to learning:     PLAN:  Treatment Recommendations: Strengthening, Range of Motion, Gait Training, Stair Training, Manual Therapy - Soft Tissue Mobilization, Manual Therapy - Joint Manipulation, Pain Management, Home Exercise Program, Patient Education, Aquatics and Modalities    []  Plan of care initiated. Plan to see patient 2 times per week for 8 weeks to address the treatment planned outlined above.   [x]  Continue with current plan of care  []  Modify plan of care as follows:    []  Hold pending physician visit  []  Discharge    Time In 1430   Time Out 1515   Timed Code Minutes: 45 min   Total Treatment Time: 45 min     Electronically Signed by: Santo Lopez PTA

## 2021-10-19 ENCOUNTER — APPOINTMENT (OUTPATIENT)
Dept: PHYSICAL THERAPY | Age: 56
End: 2021-10-19
Payer: COMMERCIAL

## 2021-10-21 ENCOUNTER — APPOINTMENT (OUTPATIENT)
Dept: PHYSICAL THERAPY | Age: 56
End: 2021-10-21
Payer: COMMERCIAL

## 2021-10-26 ENCOUNTER — HOSPITAL ENCOUNTER (OUTPATIENT)
Dept: PHYSICAL THERAPY | Age: 56
Setting detail: THERAPIES SERIES
End: 2021-10-26
Payer: COMMERCIAL

## 2022-03-24 ENCOUNTER — HOSPITAL ENCOUNTER (EMERGENCY)
Age: 57
Discharge: HOME | End: 2022-03-24
Attending: STUDENT IN AN ORGANIZED HEALTH CARE EDUCATION/TRAINING PROGRAM
Payer: COMMERCIAL

## 2022-03-24 VITALS
BODY MASS INDEX: 19.25 KG/M2 | RESPIRATION RATE: 16 BRPM | DIASTOLIC BLOOD PRESSURE: 76 MMHG | WEIGHT: 150 LBS | HEART RATE: 77 BPM | TEMPERATURE: 98 F | HEIGHT: 74 IN | SYSTOLIC BLOOD PRESSURE: 138 MMHG | OXYGEN SATURATION: 99 %

## 2022-03-24 DIAGNOSIS — M62.838 SPASM OF MUSCLE: ICD-10-CM

## 2022-03-24 DIAGNOSIS — S39.012A BACK STRAIN, INITIAL ENCOUNTER: Primary | ICD-10-CM

## 2022-03-24 PROCEDURE — 99283 EMERGENCY DEPT VISIT LOW MDM: CPT

## 2022-03-24 PROCEDURE — 96372 THER/PROPH/DIAG INJ SC/IM: CPT

## 2022-03-24 PROCEDURE — 6370000000 HC RX 637 (ALT 250 FOR IP): Performed by: STUDENT IN AN ORGANIZED HEALTH CARE EDUCATION/TRAINING PROGRAM

## 2022-03-24 PROCEDURE — 6360000002 HC RX W HCPCS: Performed by: STUDENT IN AN ORGANIZED HEALTH CARE EDUCATION/TRAINING PROGRAM

## 2022-03-24 RX ORDER — CYCLOBENZAPRINE HCL 10 MG
10 TABLET ORAL ONCE
Status: COMPLETED | OUTPATIENT
Start: 2022-03-24 | End: 2022-03-24

## 2022-03-24 RX ORDER — KETOROLAC TROMETHAMINE 30 MG/ML
30 INJECTION, SOLUTION INTRAMUSCULAR; INTRAVENOUS ONCE
Status: DISCONTINUED | OUTPATIENT
Start: 2022-03-24 | End: 2022-03-24

## 2022-03-24 RX ORDER — CYCLOBENZAPRINE HCL 10 MG
10 TABLET ORAL 2 TIMES DAILY PRN
Qty: 10 TABLET | Refills: 0 | Status: SHIPPED | OUTPATIENT
Start: 2022-03-24 | End: 2022-03-29

## 2022-03-24 RX ORDER — KETOROLAC TROMETHAMINE 30 MG/ML
30 INJECTION, SOLUTION INTRAMUSCULAR; INTRAVENOUS ONCE
Status: COMPLETED | OUTPATIENT
Start: 2022-03-24 | End: 2022-03-24

## 2022-03-24 RX ADMIN — KETOROLAC TROMETHAMINE 30 MG: 30 INJECTION, SOLUTION INTRAMUSCULAR; INTRAVENOUS at 18:07

## 2022-03-24 RX ADMIN — CYCLOBENZAPRINE 10 MG: 10 TABLET, FILM COATED ORAL at 18:07

## 2022-03-24 ASSESSMENT — PAIN DESCRIPTION - PAIN TYPE: TYPE: ACUTE PAIN

## 2022-03-24 ASSESSMENT — PAIN SCALES - GENERAL: PAINLEVEL_OUTOF10: 6

## 2022-03-24 ASSESSMENT — PAIN DESCRIPTION - LOCATION: LOCATION: BACK

## 2022-03-24 NOTE — ED PROVIDER NOTES
[facility] ENCOUNTER          Pt Name: Radha Hardy  MRN: [id number]  [place] 1965  Date of evaluation: 3/24/2022  Treating Resident Physician: Dulce Maria Florian MD  Supervising Physician: Angel Linares MD    CHIEF COMPLAINT       Chief Complaint   Patient presents with    Back Pain     x couple days after lifting an AC/moving     History obtained from chart review and the patient. HISTORY OF PRESENT ILLNESS      Radha Hrady is a 65 y.o. male w/significant PMHx of HTN who presents to the [facility] ED c/o back pain after lifting an A/C unit/moving. Pt states he felt a little off after and has not had any real issues other than some pain with flexion/extension of torso. Pt took some Tylenol at home with minimal relief. Pt able to ambulate and rotate his torso side-to-side with hips held stationary. Pt states he has no other symptoms other than a sore back when he bends forward. Denies CP, SOB, abdominal pain, fever/chills, bowel/bladder dysfunction. REVIEW OF SYSTEMS   Review of Systems   All other systems reviewed and are negative. PAST MEDICAL AND SURGICAL HISTORY     Past Medical History:   Diagnosis Date    Hypertension      History reviewed. No pertinent surgical history. MEDICATIONS   No current facility-administered medications for this encounter.     Current Outpatient Medications:     cyclobenzaprine (FLEXERIL) 10 MG tablet, Take 1 tablet by mouth 2 times daily as needed for Muscle spasms, Disp: 10 tablet, Rfl: 0    acetaminophen (TYLENOL) 500 MG tablet, Take 1 tablet by mouth 4 times daily as needed for Pain, Disp: 360 tablet, Rfl: 1    ibuprofen (IBU) 600 MG tablet, Take 1 tablet by mouth every 6 hours as needed for Pain, Disp: 120 tablet, Rfl: 0    predniSONE (DELTASONE) 10 MG tablet, 2 tablets 2 times a day for 2 days then 1  Tablet 2 times a day for 2 days, then 1 tablet daily till gone, Disp: 16 tablet, Rfl: 0   etodolac (LODINE) 400 MG tablet, Take 1 tablet by mouth 2 times daily, Disp: 60 tablet, Rfl: 3    SOCIAL HISTORY     Social History     Social History Narrative    Not on file     Social History     Tobacco Use    Smoking status: Current Every Day Smoker     Packs/day: 0.50     Types: Cigarettes    Smokeless tobacco: Never Used   Substance Use Topics    Alcohol use: Yes     Alcohol/week: 42.0 standard drinks     Types: 42 Cans of beer per week     Comment: 6 pack daily    Drug use: No     ALLERGIES   No Known Allergies    FAMILY HISTORY   History reviewed. No pertinent family history. PREVIOUS RECORDS   Previous records reviewed: 08/01-08/06/2021 for right hip/leg pain. PHYSICAL EXAM     ED Triage Vitals [03/24/22 1639]   BP Temp Temp Source Pulse Resp SpO2 Height Weight   138/76 98 °F (36.7 °C) Oral 77 16 99 % 6' 2\" (1.88 m) 150 lb (68 kg)     Initial vital signs and nursing assessment reviewed and normal. Body mass index is 19.26 kg/m². Pulsoximetry is normal per my interpretation. Additional Vital Signs:  Vitals:    03/24/22 1639   BP: 138/76   Pulse: 77   Resp: 16   Temp: 98 °F (36.7 °C)   SpO2: 99%     Physical Exam  Constitutional:       General: He is not in acute distress. Appearance: Normal appearance. He is normal weight. He is not diaphoretic. HENT:      Head: Normocephalic and atraumatic. Right Ear: External ear normal.      Left Ear: External ear normal.      Nose: Nose normal.      Mouth/Throat:      Mouth: Mucous membranes are moist.      Pharynx: Oropharynx is clear. Eyes:      Extraocular Movements: Extraocular movements intact. Conjunctiva/sclera: Conjunctivae normal.      Pupils: Pupils are equal, round, and reactive to light. Cardiovascular:      Rate and Rhythm: Normal rate and regular rhythm. Pulses: Normal pulses. Heart sounds: Normal heart sounds. No murmur heard. No friction rub. No gallop.     Pulmonary:      Effort: Pulmonary effort is normal. No respiratory distress. Breath sounds: Normal breath sounds. No stridor. No wheezing or rales. Abdominal:      General: Abdomen is flat. There is no distension. Palpations: Abdomen is soft. Tenderness: There is no abdominal tenderness. Musculoskeletal:         General: No swelling, tenderness, deformity or signs of injury. Normal range of motion. Cervical back: Normal range of motion and neck supple. Comments: Torso flexion elicits some hesitation    Skin:     General: Skin is warm and dry. Capillary Refill: Capillary refill takes less than 2 seconds. Neurological:      General: No focal deficit present. Mental Status: He is alert and oriented to person, place, and time. Cranial Nerves: No cranial nerve deficit. Sensory: No sensory deficit. Motor: No weakness. Coordination: Coordination normal.      Gait: Gait normal.   Psychiatric:         Mood and Affect: Mood normal.         Behavior: Behavior normal.         Thought Content: Thought content normal.         Judgment: Judgment normal.       MEDICAL DECISION MAKING   Initial Assessment:   1. 63yo AAM who presented for Acute LBP after lifting an A/C unit and moving items at home over the weekend. Appears to be nothing more than muscle strain/spasm. Pt has no paraspinal tenderness and is able to ambulate on his own w/o problems. No FNDs noted. Pt educated on the risk of drinking with muscle relaxers and told him to refrain from EtOH use while taking them. Pt verbally states he understands the risks.      Plan:    D/c home w/Flexeril    Work note provided     ED RESULTS   Laboratory results:  Labs Reviewed - No data to display    Radiologic studies results:  No orders to display     ED Medications administered this visit:   Medications   cyclobenzaprine (FLEXERIL) tablet 10 mg (10 mg Oral Given 3/24/22 1807)   ketorolac (TORADOL) injection 30 mg (30 mg IntraMUSCular Given 3/24/22 1807)     ED COURSE Strict return precautions and follow up instructions were discussed with the patient prior to discharge, with which the patient agrees. MEDICATION CHANGES     New Prescriptions    CYCLOBENZAPRINE (FLEXERIL) 10 MG TABLET    Take 1 tablet by mouth 2 times daily as needed for Muscle spasms     FINAL DISPOSITION     Final diagnoses:   Back strain, initial encounter   Spasm of muscle     Condition: condition: stable  Dispo: Discharge to home      This transcription was electronically signed. Parts of this transcriptions may have been dictated by use of voice recognition software and electronically transcribed, and parts may have been transcribed with the assistance of an ED scribe. The transcription may contain errors not detected in proofreading. Please refer to my supervising physician's documentation if my documentation differs.     Electronically Signed: Kvng Ya MD, 03/24/22, 6:35 PM          Kvng Ya MD  Resident  03/24/22 1021       Kvng Ya MD  Resident  03/24/22 4592

## 2022-03-24 NOTE — Clinical Note
Didier Reeves was seen and treated in our emergency department on 3/24/2022. He may return to work on 03/28/2022. If you have any questions or concerns, please don't hesitate to call.       Adriano Beckwith MD

## 2022-03-25 NOTE — ED PROVIDER NOTES
[facility]  EMERGENCY MEDICINE ATTENDING ATTESTATION      Evaluation of Lorna Alberto. Case discussed and care plan developed with resident physician. I agree with the resident physician documentation and plan as documented by him, except if my documentation differs. Patient seen, interviewed and examined by me. I reviewed the medical, surgical, family and social history, medications and allergies. I have reviewed the nursing documentation. I have reviewed the patient's vital signs and are normal per my interpretation. Body mass index is 19.26 kg/m². Pulsoxymetry is normal per my interpretation. Brief H&P   Patient c/o right paraspinal back pain after lifting a heavy air conditioner a few days ago, no lower extremity weakness, radiation of pain, numbness, no IV drug use, no fall or traumatic injury    Physical exam is notable for well appearing, no midline or paraspinal tenderness. 5 out of 5 strength in bilateral lower extremities, no sensory deficits. Medical Decision Making   MDM:   Patient is a 43-year-old male who presents with right paraspinal back pain for the past few days after living a heavy AC unit. Patient with no red flag symptoms or findings on physical exam.  Patient was treated with Toradol and Flexeril. Patient to be given prescription for muscle relaxer and instructed to use NSAIDs. Patient asked about drinking alcohol with muscle relaxer and he was instructed to not drink alcohol if he is taking the muscle relaxer. Plan:    Discharge with PCP follow-up   NSAIDs, muscle relaxer   Return precautions    Please see the resident physician completed note for final disposition except as documented on this attestation. I have reviewed and interpreted all available lab, radiology and ekg results available at the moment. Diagnosis, treatment and disposition plans were discussed and agreed upon by patient.    This transcription was

## 2023-01-16 ENCOUNTER — HOSPITAL ENCOUNTER (EMERGENCY)
Age: 58
Discharge: HOME | End: 2023-01-16
Attending: EMERGENCY MEDICINE
Payer: COMMERCIAL

## 2023-01-16 VITALS
WEIGHT: 150 LBS | TEMPERATURE: 98.3 F | DIASTOLIC BLOOD PRESSURE: 87 MMHG | HEIGHT: 74 IN | RESPIRATION RATE: 16 BRPM | OXYGEN SATURATION: 100 % | HEART RATE: 75 BPM | SYSTOLIC BLOOD PRESSURE: 154 MMHG | BODY MASS INDEX: 19.25 KG/M2

## 2023-01-16 DIAGNOSIS — H10.9 BACTERIAL CONJUNCTIVITIS OF RIGHT EYE: Primary | ICD-10-CM

## 2023-01-16 PROCEDURE — 99283 EMERGENCY DEPT VISIT LOW MDM: CPT

## 2023-01-16 RX ORDER — POLYMYXIN B SULFATE AND TRIMETHOPRIM 1; 10000 MG/ML; [USP'U]/ML
1 SOLUTION OPHTHALMIC EVERY 4 HOURS
Qty: 20 ML | Refills: 0 | Status: SHIPPED | OUTPATIENT
Start: 2023-01-16 | End: 2023-01-23

## 2023-01-16 ASSESSMENT — PAIN SCALES - GENERAL: PAINLEVEL_OUTOF10: 0

## 2023-01-16 ASSESSMENT — PAIN - FUNCTIONAL ASSESSMENT: PAIN_FUNCTIONAL_ASSESSMENT: 0-10

## 2023-01-16 NOTE — Clinical Note
Reji Julien was seen and treated in our emergency department on 1/16/2023. He may return to work on 01/17/2023. If you have any questions or concerns, please don't hesitate to call.       Giselle Manuel, DO

## 2023-01-16 NOTE — ED TRIAGE NOTES
Patient presents to ER with complaints of right eye redness that started yesterday. Visible yellow green discharge noted. Sclera pink and red. Patient denies pain.

## 2023-01-16 NOTE — ED PROVIDER NOTES
[facility] [address] [address] EMERGENCY DEPT    EMERGENCY MEDICINE      Pt Name: Alyse Ford  MRN: [id number]  [place] 1965  Date of evaluation: 1/16/2023  Treating Resident Physician: Sae Chappell DO  Supervising Physician: [address] [place] [address]       Chief Complaint   Patient presents with    Conjunctivitis     Right eye     History obtained from chart review and the patient. HISTORY OF PRESENT ILLNESS    HPI    Alyse Ford is a 65 y.o. male presents to the emergency department for evaluation of right eye redness and drainage. Patient stated started yesterday but got worse overnight. He states there was some crusting this morning. Denies any systemic symptoms such as fever, chills, blurred vision, headache, difficulty breathing or chest pain. Does not recall any contamination to the eye. Does not have any pain with movement or discomfort while seated. Reports of a mildly annoying that he has the frequent tearing. Does admit to associated purulent discharge towards the middle aspect of the eye. No other chronic medical complaints or prescription at this time. He denies any history of acute angle-closure glaucoma. The patient has no other acute complaints at this time. PAST MEDICAL AND SURGICAL HISTORY     Past Medical History:   Diagnosis Date    Hypertension        No past surgical history on file. CURRENT MEDICATIONS     Previous Medications    ACETAMINOPHEN (TYLENOL) 500 MG TABLET    Take 1 tablet by mouth 4 times daily as needed for Pain    ETODOLAC (LODINE) 400 MG TABLET    Take 1 tablet by mouth 2 times daily    IBUPROFEN (IBU) 600 MG TABLET    Take 1 tablet by mouth every 6 hours as needed for Pain    PREDNISONE (DELTASONE) 10 MG TABLET    2 tablets 2 times a day for 2 days then 1  Tablet 2 times a day for 2 days, then 1 tablet daily till gone       ALLERGIES   No Known Allergies    FAMILY HISTORY   No family history on file.     SOCIAL HISTORY     Social History     Tobacco Use    Smoking status: Every Day     Packs/day: 0.50     Types: Cigarettes    Smokeless tobacco: Never   Substance Use Topics    Alcohol use: Yes     Alcohol/week: 42.0 standard drinks     Types: 42 Cans of beer per week     Comment: 6 pack daily    Drug use: No       PHYSICAL EXAM     ED Triage Vitals [01/16/23 0836]   BP Temp Temp Source Heart Rate Resp SpO2 Height Weight   (!) 154/87 98.3 °F (36.8 °C) Oral 75 16 100 % 6' 2\" (1.88 m) 150 lb (68 kg)     Body mass index is 19.26 kg/m². Initial vital signs and nursing assessment reviewed and normal.    Physical Exam  Constitutional:       General: He is not in acute distress. Appearance: Normal appearance. He is not ill-appearing, toxic-appearing or diaphoretic. HENT:      Head: Normocephalic and atraumatic. Mouth/Throat:      Mouth: Mucous membranes are moist.      Pharynx: Oropharynx is clear. No oropharyngeal exudate or posterior oropharyngeal erythema. Eyes:      General: No scleral icterus. Right eye: Discharge (Purulent drainage of the medial canthus) present. No foreign body. Left eye: No foreign body. Extraocular Movements: Extraocular movements intact. Right eye: Normal extraocular motion. Left eye: Normal extraocular motion. Conjunctiva/sclera:      Right eye: Right conjunctiva is injected. Exudate present. Pupils: Pupils are equal, round, and reactive to light. Comments: No pain with extraocular motion. No erythema, swelling or other evidence of preseptal/post septal cellulitis. Do not suspect endophthalmitis or acute angle-closure glaucoma. Cardiovascular:      Rate and Rhythm: Normal rate and regular rhythm. Pulses: Normal pulses. Heart sounds: Normal heart sounds. No murmur heard. No friction rub. No gallop. Pulmonary:      Effort: Pulmonary effort is normal.      Breath sounds: Normal breath sounds. No wheezing, rhonchi or rales.    Abdominal:      Palpations: Abdomen is soft.      Tenderness: There is no abdominal tenderness. There is no guarding or rebound. Musculoskeletal:      Cervical back: Normal range of motion. No rigidity. No muscular tenderness. Skin:     General: Skin is warm and dry. Capillary Refill: Capillary refill takes less than 2 seconds. Neurological:      General: No focal deficit present. Mental Status: He is alert and oriented to person, place, and time. Cranial Nerves: No cranial nerve deficit. Sensory: No sensory deficit. Motor: No weakness. FORMAL DIAGNOSTIC RESULTS     RADIOLOGY: Interpretation per the Radiologist below, if available at the time of this note (none if blank): No orders to display       LABS: (none if blank)  Labs Reviewed - No data to display    (Any cultures that may have been sent were not resulted at the time of this patient visit)    [address]     1) Number and Complexity of Problems            Problem List This Visit:         Chief Complaint   Patient presents with    Conjunctivitis     Right eye            Differential Diagnosis includes (but not limited to): Bacterial conjunctivitis, viral conjunctivitis        Diagnoses Considered with low index of suspicion:   Subconjunctival hemorrhage, iritis, acute angle-closure glaucoma, preseptal/septal cellulitis, endophthalmitis             Pertinent Comorbid Conditions:    None    2)  Data Reviewed (none if blank or additional interpretation can be found in ED Course)          My Independent interpretations:     EKG:          Imaging:     Labs:                       Decision Rules/Clinical Scores utilized:              External Documentation Reviewed:   Previous patient encounter documents & history available on EMR was reviewed as available. Patient last seen for back strain approximately 1 year ago .       3)  Treatment and Disposition         ED Reassessment: Patient continues to rest comfortably with no complaints other than his right eye and discharge. Case discussed with consulting clinician:           Shared Decision-Making was performed and disposition discussed with the        patient/caregiver at bedside with all questions answered. Patient agreeable to  prescription and use as directed. Discussed strict return precautions that include development of fevers, chills, vision changes, development of pain. Social determinants of health impacting treatment or disposition: None         Code Status: Full      Summary of Patient Presentation:      MDM  Number of Diagnoses or Management Options  Bacterial conjunctivitis of right eye: new, no workup  Diagnosis management comments: Pleasant 78-year-old with physical exam findings of bacterial conjunctivitis. No pain at rest and no pain with ancestral ocular movements. Pupils are equal and reactive. No vision changes. Purulent drainage pulling at the medial canthus.  7 days of ophthalmic antibiotics. Patient encouraged to recheck with PCP in 3 days for improvement. Reviewed strict return precautions. Amount and/or Complexity of Data Reviewed  Decide to obtain previous medical records or to obtain history from someone other than the patient: yes    Risk of Complications, Morbidity, and/or Mortality  Presenting problems: minimal  Diagnostic procedures: minimal  Management options: minimal    Patient Progress  Patient progress: stable  /   Vitals Reviewed:    Vitals:    01/16/23 0836   BP: (!) 154/87   Pulse: 75   Resp: 16   Temp: 98.3 °F (36.8 °C)   TempSrc: Oral   SpO2: 100%   Weight: 150 lb (68 kg)   Height: 6' 2\" (1.88 m)       The patient was seen and examined. Appropriate diagnostic testing was performed and results reviewed with the patient and/or caregivers. The results of pertinent diagnostic studies and exam findings were discussed.  The patients provisional diagnosis and plan of care were discussed with the patient and present caregivers who expressed understanding. Any medications were reviewed and indications and risks of medications were discussed. Strict verbal and written return precautions, instructions and appropriate follow-up were provided to the patient. ED Medications administered this visit:  (None if blank)  Medications - No data to display    PROCEDURES: (None if blank)  Procedures:     CRITICAL CARE: (None if blank)    DISCHARGE PRESCRIPTIONS: (None if blank)  New Prescriptions    TRIMETHOPRIM-POLYMYXIN B (POLYTRIM) 61869-7.1 UNIT/ML-% OPHTHALMIC SOLUTION    Place 1 drop into the right eye every 4 hours for 7 days         FINAL IMPRESSION     Final diagnoses:   Bacterial conjunctivitis of right eye     1. Bacterial conjunctivitis of right eye        DISPOSITION / PLAN   DISPOSITION Decision To Discharge 01/16/2023 08:46:33 AM      OUTPATIENT FOLLOW UP THE PATIENT:  Rio Mcrae MD  [facility] [address]  [phone]    Schedule an appointment as soon as possible for a visit in 3 days      [address] [place] [address] EMERGENCY DEPT  [facility]  [address] [place] [address]  [phone]    As needed, If symptoms worsen      This transcription was electronically signed. Parts of this transcriptions may have been dictated by use of voice recognition software and electronically transcribed, and parts may have been transcribed with the assistance of an ED scribe. The transcription may contain errors not detected in proofreading. Please refer to my supervising physician's documentation if my documentation differs.     Electronically Signed: Festus Pratt DO, 01/16/23, 8:51 AM         Festus Pratt DO  Resident  01/16/23 0735

## 2023-01-20 ENCOUNTER — APPOINTMENT (OUTPATIENT)
Dept: GENERAL RADIOLOGY | Age: 58
End: 2023-01-20
Payer: COMMERCIAL

## 2023-01-20 ENCOUNTER — HOSPITAL ENCOUNTER (EMERGENCY)
Age: 58
Discharge: HOME | End: 2023-01-20
Payer: COMMERCIAL

## 2023-01-20 VITALS
BODY MASS INDEX: 19.25 KG/M2 | HEIGHT: 74 IN | DIASTOLIC BLOOD PRESSURE: 90 MMHG | OXYGEN SATURATION: 100 % | HEART RATE: 66 BPM | WEIGHT: 150 LBS | TEMPERATURE: 98.1 F | SYSTOLIC BLOOD PRESSURE: 140 MMHG | RESPIRATION RATE: 16 BRPM

## 2023-01-20 DIAGNOSIS — M25.571 ACUTE RIGHT ANKLE PAIN: Primary | ICD-10-CM

## 2023-01-20 DIAGNOSIS — M25.521 RIGHT ELBOW PAIN: ICD-10-CM

## 2023-01-20 PROCEDURE — 99283 EMERGENCY DEPT VISIT LOW MDM: CPT

## 2023-01-20 PROCEDURE — 73080 X-RAY EXAM OF ELBOW: CPT

## 2023-01-20 PROCEDURE — 73610 X-RAY EXAM OF ANKLE: CPT

## 2023-01-20 ASSESSMENT — ENCOUNTER SYMPTOMS
VOMITING: 0
COUGH: 0
NAUSEA: 0
DIARRHEA: 0
SORE THROAT: 0

## 2023-01-20 ASSESSMENT — PAIN SCALES - GENERAL: PAINLEVEL_OUTOF10: 2

## 2023-01-20 NOTE — ED PROVIDER NOTES
[facility] [address] [address] EMERGENCY DEPT      EMERGENCY MEDICINE     Pt Name: Chun Chappell  MRN: [id number]  [place] 1965  Date of evaluation: 1/20/2023  Provider: Debbie Solomon, [address] [place] [address]       Chief Complaint   Patient presents with    Joint Swelling     Right elbow pain- no known injury    Ankle Pain     Right ankle pain since yesterday- no known injury     HISTORY OF PRESENT ILLNESS   Chun Chappell is a pleasant 65 y.o. male who presents to the emergency department from from home, by private vehicle for evaluation of right ankle and right elbow pain. He states this been going the last couple days. He does stuff at work but cannot remember doing anything. He had a cough work today because of the pain. He is able to bear weight and ambulate. .      Review Of Systems   Review of Systems   Constitutional:  Negative for chills and fever. HENT:  Negative for congestion, ear pain and sore throat. Respiratory:  Negative for cough. Cardiovascular:  Negative for chest pain and palpitations. Gastrointestinal:  Negative for diarrhea, nausea and vomiting. Genitourinary:  Negative for dysuria and urgency. Musculoskeletal:  Negative for myalgias and neck pain. Right ankle and right elbow pain   Skin:  Negative for rash. All other systems reviewed and are negative. PASTMEDICAL HISTORY     Past Medical History:   Diagnosis Date    Hypertension        There is no problem list on file for this patient. SURGICAL HISTORY     No past surgical history on file.     CURRENT MEDICATIONS       Discharge Medication List as of 1/20/2023 12:33 PM        CONTINUE these medications which have NOT CHANGED    Details   trimethoprim-polymyxin b (POLYTRIM) 79624-4.1 UNIT/ML-% ophthalmic solution Place 1 drop into the right eye every 4 hours for 7 days, Disp-20 mL, R-0Normal      acetaminophen (TYLENOL) 500 MG tablet Take 1 tablet by mouth 4 times daily as needed for Pain, Disp-360 tablet, R-1Normal ibuprofen (IBU) 600 MG tablet Take 1 tablet by mouth every 6 hours as needed for Pain, Disp-120 tablet, R-0Normal      predniSONE (DELTASONE) 10 MG tablet 2 tablets 2 times a day for 2 days then 1  Tablet 2 times a day for 2 days, then 1 tablet daily till gone, Disp-16 tablet, R-0Normal      etodolac (LODINE) 400 MG tablet Take 1 tablet by mouth 2 times daily, Disp-60 tablet, R-3Normal             ALLERGIES     has No Known Allergies. FAMILY HISTORY     has no family status information on file. SOCIAL HISTORY       Social History     Tobacco Use    Smoking status: Every Day     Packs/day: 0.50     Types: Cigarettes    Smokeless tobacco: Never   Substance Use Topics    Alcohol use: Yes     Alcohol/week: 42.0 standard drinks     Types: 42 Cans of beer per week     Comment: 6 pack daily    Drug use: No       PHYSICAL EXAM       ED Triage Vitals [01/20/23 1138]   BP Temp Temp Source Heart Rate Resp SpO2 Height Weight   (!) 140/90 98.1 °F (36.7 °C) Oral 66 16 100 % 6' 2\" (1.88 m) 150 lb (68 kg)       Additional Vital Signs:  Vitals:    01/20/23 1138   BP: (!) 140/90   Pulse: 66   Resp: 16   Temp: 98.1 °F (36.7 °C)   SpO2: 100%     Physical Exam  Vitals and nursing note reviewed. HENT:      Head: Normocephalic and atraumatic. Eyes:      Pupils: Pupils are equal, round, and reactive to light. Neck:      Thyroid: No thyromegaly. Trachea: No tracheal deviation. Cardiovascular:      Rate and Rhythm: Normal rate and regular rhythm. Heart sounds: No murmur heard. No friction rub. Pulmonary:      Effort: Pulmonary effort is normal. No respiratory distress. Breath sounds: Normal breath sounds. No wheezing. Abdominal:      General: Bowel sounds are normal. There is no distension. Palpations: Abdomen is soft. Tenderness: There is no abdominal tenderness. Musculoskeletal:      Cervical back: Neck supple. Comments: Tenderness to lateral malleolus and the olecranon.   Right ankle right elbow. Neurovascular intact flocked range of motion no redness. Skin:     General: Skin is warm and dry. Findings: No erythema or rash. Neurological:      Mental Status: He is alert and oriented to person, place, and time. FORMAL DIAGNOSTIC RESULTS     RADIOLOGY: Interpretation per the Radiologist below, if available at the time of this note (none if blank):    XR ANKLE RIGHT (MIN 3 VIEWS)   Final Result   Normal right ankle series. **This report has been created using voice recognition software. It may contain minor errors which are inherent in voice recognition technology. **      Final report electronically signed by Dr. Huey Gracia MD on 1/20/2023 12:19 PM      XR ELBOW RIGHT (MIN 3 VIEWS)   Final Result    Normal right elbow series. **This report has been created using voice recognition software. It may contain minor errors which are inherent in voice recognition technology. **      Final report electronically signed by Dr. Huey Gracia MD on 1/20/2023 12:20 PM          LABS: (none if blank)  Labs Reviewed - No data to display    (Any cultures that may have been sent were not resulted at the time of this patient visit)    [facility] / ED COURSE:     1) Number and Complexity of Problems            Problem List This Visit:         Chief Complaint   Patient presents with    Joint Swelling     Right elbow pain- no known injury    Ankle Pain     Right ankle pain since yesterday- no known injury           Differential Diagnosis includes (but not limited to):  Right ankle sprain strain right elbow sprain strain        Diagnoses Considered but I have low suspicion of:   Septic arthritis             Pertinent Comorbid Conditions:    None    2)  Data Reviewed (none if left blank)          My Independent interpretations:     EKG:      None    Imaging: None    Labs:      None                 Decision Rules/Clinical Scores utilized: None            External Documentation Reviewed:         Previous patient encounter documents & history available on EMR was reviewed yes             See Formal Diagnostic Results above for the lab and radiology tests and orders. 3)  Treatment and Disposition         ED Reassessment: Stable         Case discussed with (none if left blank)         Shared Decision-Making was performed and disposition discussed with the        Patient/Family and questions answered yes         Social determinants of health impacting treatment or disposition:  None         Code Status:  N/A      Summary of Patient Presentation:      MDM     Amount and/or Complexity of Data Reviewed  Tests in the radiology section of CPT®: ordered  Discussion of test results with the performing providers: no  Decide to obtain previous medical records or to obtain history from someone other than the patient: no  Obtain history from someone other than the patient: no  Review and summarize past medical records: yes  Discuss the patient with other providers: no  Independent visualization of images, tracings, or specimens: no    Risk of Complications, Morbidity, and/or Mortality  Presenting problems: low  Diagnostic procedures: low  Management options: low    Patient Progress  Patient progress: improved  /   Vitals Reviewed:    Vitals:    01/20/23 1138   BP: (!) 140/90   Pulse: 66   Resp: 16   Temp: 98.1 °F (36.7 °C)   TempSrc: Oral   SpO2: 100%   Weight: 150 lb (68 kg)   Height: 6' 2\" (1.88 m)       The patient was seen and examined. Appropriate diagnostic testing was performed and results reviewed with the patient. The results of pertinent diagnostic studies and exam findings were discussed. The patients provisional diagnosis and plan of care were discussed with the patient and present family who expressed understanding. Any medications were reviewed and indications and risks of medications were discussed with the patient /family present.  Strict verbal and written return precautions, instructions and appropriate follow-up provided to  the patient . ED Medications administered this visit:  (None if blank)  Medications - No data to display      PROCEDURES: (None if blank)      CRITICAL CARE: (None if blank)      DISCHARGE PRESCRIPTIONS: (None if blank)  Discharge Medication List as of 1/20/2023 12:33 PM        START taking these medications    Details   etodolac (LODINE) 300 MG capsule Take 1 capsule by mouth in the morning and 1 capsule at noon and 1 capsule in the evening., Disp-30 capsule, R-0Normal             FINAL IMPRESSION      1. Acute right ankle pain    2.  Right elbow pain          DISPOSITION/PLAN   DISPOSITION Decision To Discharge 01/20/2023 12:32:21 PM      OUTPATIENT FOLLOW UP THE PATIENT:  [facility], [facility] [address]. [phone]  [phone]  In 2 days      ALIZA [place], [place]  01/20/23 7598

## 2023-01-20 NOTE — ED NOTES
Pt ambulatory to intake D w/o difficulty- c/o right elbow pain and right ankle pain, no known injury. States he couldn't walk this morning to go to work- hasn't taken anything for pain PTA.      Chris Bhakta RN  01/20/23 7297

## 2023-01-20 NOTE — Clinical Note
Trisha Mesa was seen and treated in our emergency department on 1/20/2023. He may return to work on . If you have any questions or concerns, please don't hesitate to call.       ALIZA Stewart

## 2023-05-30 ENCOUNTER — HOSPITAL ENCOUNTER (EMERGENCY)
Age: 58
Discharge: HOME | End: 2023-05-30

## 2023-05-30 ENCOUNTER — APPOINTMENT (OUTPATIENT)
Dept: GENERAL RADIOLOGY | Age: 58
End: 2023-05-30

## 2023-05-30 VITALS
DIASTOLIC BLOOD PRESSURE: 93 MMHG | RESPIRATION RATE: 15 BRPM | BODY MASS INDEX: 19.25 KG/M2 | TEMPERATURE: 98.4 F | HEART RATE: 102 BPM | WEIGHT: 150 LBS | OXYGEN SATURATION: 99 % | SYSTOLIC BLOOD PRESSURE: 129 MMHG | HEIGHT: 74 IN

## 2023-05-30 DIAGNOSIS — M25.531 ACUTE PAIN OF RIGHT WRIST: Primary | ICD-10-CM

## 2023-05-30 PROCEDURE — 99283 EMERGENCY DEPT VISIT LOW MDM: CPT

## 2023-05-30 PROCEDURE — 73110 X-RAY EXAM OF WRIST: CPT

## 2023-05-30 RX ORDER — INDOMETHACIN 50 MG/1
50 CAPSULE ORAL 2 TIMES DAILY WITH MEALS
Qty: 20 CAPSULE | Refills: 0 | Status: SHIPPED | OUTPATIENT
Start: 2023-05-30

## 2023-05-30 RX ORDER — METHYLPREDNISOLONE 4 MG/1
TABLET ORAL
Qty: 1 KIT | Refills: 0 | Status: SHIPPED | OUTPATIENT
Start: 2023-05-30 | End: 2023-06-05

## 2023-05-30 ASSESSMENT — PAIN SCALES - GENERAL: PAINLEVEL_OUTOF10: 10

## 2023-05-30 ASSESSMENT — PAIN DESCRIPTION - ORIENTATION: ORIENTATION: RIGHT

## 2023-05-30 ASSESSMENT — PAIN - FUNCTIONAL ASSESSMENT: PAIN_FUNCTIONAL_ASSESSMENT: 0-10

## 2023-05-30 ASSESSMENT — PAIN DESCRIPTION - LOCATION: LOCATION: WRIST

## 2023-05-30 NOTE — ED NOTES
Pt arrives to the ED for right wrist swelling and pain that started yesterday. Pt denies any injury to the area. Rates his pain a 10/10.  Has notable swelling of the bryan wrist       Kesha Rutherford RN  05/30/23 7432

## 2023-05-30 NOTE — ED NOTES
Discharge instructions and new medications discussed and explained with Pt. Pt. Verbalized understanding and has no further questions or needs at this time.         Bharti Villatoro RN  05/30/23 1180

## 2023-05-30 NOTE — ED PROVIDER NOTES
[address] [facility] [address] [address] EMERGENCY DEPT      EMERGENCY MEDICINE     Pt Name: Tequila Salcido  MRN: [id number]  [place] 1965  Date of evaluation: 5/30/2023  Provider: Sandi Garcia [address] [place] [address]       Chief Complaint   Patient presents with    Wrist Pain     HISTORY OF PRESENT ILLNESS   Tequila Salcido is a pleasant 63 y.o. male who presents to the emergency department from home with right wrist pain. Patient states been hurting over the last 2 to 3 days. However last night it became red and swollen. Aching in nature constant worse with certain motions movements touch not makes better or worse. No injury. No numbness tingling right hand. Patient right-hand-dominant. No elbow or shoulder pain. No other complaints. PASTMEDICAL HISTORY     Past Medical History:   Diagnosis Date    Hypertension        There is no problem list on file for this patient. SURGICAL HISTORY     No past surgical history on file. CURRENT MEDICATIONS       Discharge Medication List as of 5/30/2023  8:27 AM        CONTINUE these medications which have NOT CHANGED    Details   etodolac (LODINE) 300 MG capsule Take 1 capsule by mouth in the morning and 1 capsule at noon and 1 capsule in the evening., Disp-30 capsule, R-0Normal      acetaminophen (TYLENOL) 500 MG tablet Take 1 tablet by mouth 4 times daily as needed for Pain, Disp-360 tablet, R-1Normal      ibuprofen (IBU) 600 MG tablet Take 1 tablet by mouth every 6 hours as needed for Pain, Disp-120 tablet, R-0Normal      predniSONE (DELTASONE) 10 MG tablet 2 tablets 2 times a day for 2 days then 1  Tablet 2 times a day for 2 days, then 1 tablet daily till gone, Disp-16 tablet, R-0Normal      etodolac (LODINE) 400 MG tablet Take 1 tablet by mouth 2 times daily, Disp-60 tablet, R-3Normal             ALLERGIES     has No Known Allergies. FAMILY HISTORY     has no family status information on file.         SOCIAL HISTORY       Social History     Tobacco Use    Smoking

## 2023-08-28 ENCOUNTER — APPOINTMENT (OUTPATIENT)
Dept: CT IMAGING | Age: 58
End: 2023-08-28

## 2023-08-28 ENCOUNTER — HOSPITAL ENCOUNTER (EMERGENCY)
Age: 58
Discharge: LEFT BEFORE DISCHARGE | End: 2023-08-28
Attending: EMERGENCY MEDICINE

## 2023-08-28 ENCOUNTER — APPOINTMENT (OUTPATIENT)
Dept: GENERAL RADIOLOGY | Age: 58
End: 2023-08-28

## 2023-08-28 VITALS
OXYGEN SATURATION: 99 % | HEART RATE: 65 BPM | TEMPERATURE: 98.2 F | BODY MASS INDEX: 19.25 KG/M2 | SYSTOLIC BLOOD PRESSURE: 139 MMHG | HEIGHT: 74 IN | RESPIRATION RATE: 16 BRPM | WEIGHT: 150 LBS | DIASTOLIC BLOOD PRESSURE: 68 MMHG

## 2023-08-28 DIAGNOSIS — Z53.29 LEFT AGAINST MEDICAL ADVICE: ICD-10-CM

## 2023-08-28 DIAGNOSIS — I63.9 CEREBROVASCULAR ACCIDENT (CVA), UNSPECIFIED MECHANISM (HCC): Primary | ICD-10-CM

## 2023-08-28 LAB
ALBUMIN SERPL BCG-MCNC: 4 G/DL (ref 3.5–5.1)
ALP SERPL-CCNC: 90 U/L (ref 38–126)
ALT SERPL W/O P-5'-P-CCNC: 13 U/L (ref 11–66)
AMPHETAMINES UR QL SCN: NEGATIVE
ANION GAP SERPL CALC-SCNC: 13 MEQ/L (ref 8–16)
APTT PPP: 36.7 SECONDS (ref 22–38)
AST SERPL-CCNC: 25 U/L (ref 5–40)
BACTERIA URNS QL MICRO: ABNORMAL /HPF
BARBITURATES UR QL SCN: NEGATIVE
BASOPHILS ABSOLUTE: 0.1 THOU/MM3 (ref 0–0.1)
BASOPHILS NFR BLD AUTO: 0.5 %
BENZODIAZ UR QL SCN: NEGATIVE
BILIRUB SERPL-MCNC: 0.8 MG/DL (ref 0.3–1.2)
BILIRUB UR QL STRIP.AUTO: NEGATIVE
BUN SERPL-MCNC: 11 MG/DL (ref 7–22)
BZE UR QL SCN: NEGATIVE
CALCIUM SERPL-MCNC: 9.6 MG/DL (ref 8.5–10.5)
CANNABINOIDS UR QL SCN: NEGATIVE
CASTS #/AREA URNS LPF: ABNORMAL /LPF
CASTS 2: ABNORMAL /LPF
CHARACTER UR: CLEAR
CHLORIDE SERPL-SCNC: 96 MEQ/L (ref 98–111)
CO2 SERPL-SCNC: 24 MEQ/L (ref 23–33)
COLOR: ABNORMAL
CREAT SERPL-MCNC: 0.9 MG/DL (ref 0.4–1.2)
CRYSTALS URNS MICRO: ABNORMAL
DEPRECATED RDW RBC AUTO: 45.8 FL (ref 35–45)
EOSINOPHIL NFR BLD AUTO: 1.4 %
EOSINOPHILS ABSOLUTE: 0.1 THOU/MM3 (ref 0–0.4)
EPITHELIAL CELLS, UA: ABNORMAL /HPF
ERYTHROCYTE [DISTWIDTH] IN BLOOD BY AUTOMATED COUNT: 14.6 % (ref 11.5–14.5)
ETHANOL SERPL-MCNC: < 0.01 %
GFR SERPL CREATININE-BSD FRML MDRD: > 60 ML/MIN/1.73M2
GLUCOSE BLD STRIP.AUTO-MCNC: 97 MG/DL (ref 70–108)
GLUCOSE SERPL-MCNC: 95 MG/DL (ref 70–108)
GLUCOSE UR QL STRIP.AUTO: NEGATIVE MG/DL
HCT VFR BLD AUTO: 49.4 % (ref 42–52)
HGB BLD-MCNC: 16.3 GM/DL (ref 14–18)
HGB UR QL STRIP.AUTO: ABNORMAL
IMM GRANULOCYTES # BLD AUTO: 0.02 THOU/MM3 (ref 0–0.07)
IMM GRANULOCYTES NFR BLD AUTO: 0.2 %
INR PPP: 0.91 (ref 0.85–1.13)
KETONES UR QL STRIP.AUTO: 40
LYMPHOCYTES ABSOLUTE: 4.8 THOU/MM3 (ref 1–4.8)
LYMPHOCYTES NFR BLD AUTO: 44.8 %
MAGNESIUM SERPL-MCNC: 2.1 MG/DL (ref 1.6–2.4)
MCH RBC QN AUTO: 28.6 PG (ref 26–33)
MCHC RBC AUTO-ENTMCNC: 33 GM/DL (ref 32.2–35.5)
MCV RBC AUTO: 86.7 FL (ref 80–94)
MISCELLANEOUS 2: ABNORMAL
MONOCYTES ABSOLUTE: 1.2 THOU/MM3 (ref 0.4–1.3)
MONOCYTES NFR BLD AUTO: 11 %
NEUTROPHILS NFR BLD AUTO: 42.1 %
NITRITE UR QL STRIP: NEGATIVE
NRBC BLD AUTO-RTO: 0 /100 WBC
NT-PROBNP SERPL IA-MCNC: 100.3 PG/ML (ref 0–124)
OPIATES UR QL SCN: NEGATIVE
OSMOLALITY SERPL CALC.SUM OF ELEC: 265.6 MOSMOL/KG (ref 275–300)
OXYCODONE: NEGATIVE
PH UR STRIP.AUTO: 5 [PH] (ref 5–9)
PHENCYCLIDINE QUANTITATIVE URINE: NEGATIVE
PLATELET # BLD AUTO: 351 THOU/MM3 (ref 130–400)
PMV BLD AUTO: 9.7 FL (ref 9.4–12.4)
POTASSIUM SERPL-SCNC: 4.2 MEQ/L (ref 3.5–5.2)
PROT SERPL-MCNC: 7.6 G/DL (ref 6.1–8)
PROT UR STRIP.AUTO-MCNC: ABNORMAL MG/DL
RBC # BLD AUTO: 5.7 MILL/MM3 (ref 4.7–6.1)
RBC URINE: ABNORMAL /HPF
REASON FOR REJECTION: NORMAL
REJECTED TEST: NORMAL
RENAL EPI CELLS #/AREA URNS HPF: ABNORMAL /[HPF]
SEGMENTED NEUTROPHILS ABSOLUTE COUNT: 4.5 THOU/MM3 (ref 1.8–7.7)
SODIUM SERPL-SCNC: 133 MEQ/L (ref 135–145)
SP GR UR REFRACT.AUTO: 1.02 (ref 1–1.03)
TROPONIN, HIGH SENSITIVITY: 12 NG/L (ref 0–12)
TROPONIN, HIGH SENSITIVITY: 15 NG/L (ref 0–12)
UROBILINOGEN, URINE: 1 EU/DL (ref 0–1)
WBC # BLD AUTO: 10.7 THOU/MM3 (ref 4.8–10.8)
WBC #/AREA URNS HPF: ABNORMAL /HPF
WBC #/AREA URNS HPF: NEGATIVE /[HPF]
YEAST LIKE FUNGI URNS QL MICRO: ABNORMAL

## 2023-08-28 PROCEDURE — 93010 ELECTROCARDIOGRAM REPORT: CPT | Performed by: NUCLEAR MEDICINE

## 2023-08-28 PROCEDURE — 71045 X-RAY EXAM CHEST 1 VIEW: CPT

## 2023-08-28 PROCEDURE — 36415 COLL VENOUS BLD VENIPUNCTURE: CPT

## 2023-08-28 PROCEDURE — 84484 ASSAY OF TROPONIN QUANT: CPT

## 2023-08-28 PROCEDURE — 83735 ASSAY OF MAGNESIUM: CPT

## 2023-08-28 PROCEDURE — 99285 EMERGENCY DEPT VISIT HI MDM: CPT

## 2023-08-28 PROCEDURE — 70496 CT ANGIOGRAPHY HEAD: CPT

## 2023-08-28 PROCEDURE — 81001 URINALYSIS AUTO W/SCOPE: CPT

## 2023-08-28 PROCEDURE — 80053 COMPREHEN METABOLIC PANEL: CPT

## 2023-08-28 PROCEDURE — 82948 REAGENT STRIP/BLOOD GLUCOSE: CPT

## 2023-08-28 PROCEDURE — 93005 ELECTROCARDIOGRAM TRACING: CPT

## 2023-08-28 PROCEDURE — 85610 PROTHROMBIN TIME: CPT

## 2023-08-28 PROCEDURE — 83880 ASSAY OF NATRIURETIC PEPTIDE: CPT

## 2023-08-28 PROCEDURE — 82077 ASSAY SPEC XCP UR&BREATH IA: CPT

## 2023-08-28 PROCEDURE — 85025 COMPLETE CBC W/AUTO DIFF WBC: CPT

## 2023-08-28 PROCEDURE — 80305 DRUG TEST PRSMV DIR OPT OBS: CPT

## 2023-08-28 PROCEDURE — 70450 CT HEAD/BRAIN W/O DYE: CPT

## 2023-08-28 PROCEDURE — 6360000004 HC RX CONTRAST MEDICATION

## 2023-08-28 PROCEDURE — 70498 CT ANGIOGRAPHY NECK: CPT

## 2023-08-28 PROCEDURE — 85730 THROMBOPLASTIN TIME PARTIAL: CPT

## 2023-08-28 RX ORDER — ATORVASTATIN CALCIUM 40 MG/1
40 TABLET, FILM COATED ORAL DAILY
Qty: 30 TABLET | Refills: 3 | Status: SHIPPED | OUTPATIENT
Start: 2023-08-28

## 2023-08-28 RX ORDER — CLOPIDOGREL BISULFATE 75 MG/1
75 TABLET ORAL DAILY
Qty: 30 TABLET | Refills: 0 | Status: SHIPPED | OUTPATIENT
Start: 2023-08-28

## 2023-08-28 RX ORDER — ASPIRIN 81 MG/1
81 TABLET, CHEWABLE ORAL DAILY
Qty: 30 TABLET | Refills: 0 | Status: SHIPPED | OUTPATIENT
Start: 2023-08-28

## 2023-08-28 RX ADMIN — IOPAMIDOL 80 ML: 755 INJECTION, SOLUTION INTRAVENOUS at 14:42

## 2023-08-28 ASSESSMENT — PAIN - FUNCTIONAL ASSESSMENT: PAIN_FUNCTIONAL_ASSESSMENT: NONE - DENIES PAIN

## 2023-08-28 NOTE — DISCHARGE INSTRUCTIONS
Return to the ED immediately for any change or worsening of symptoms including chest pain, shortness of breath, weakness, syncope, dizziness, bleeding, nausea or vomiting.

## 2023-08-28 NOTE — ED PROVIDER NOTES
[facility] DEPT  EMERGENCY DEPARTMENT ENCOUNTER          Pt Name: Elisha Wright  MRN: [id number]  [address] [facility] 1965  Date of evaluation: 8/28/2023  Physician: Hussein Olvera MD  Supervising Attending Physician: Latonia att. providers found       [address]       Chief Complaint   Patient presents with    Extremity Weakness         HISTORY OF PRESENT ILLNESS    HPI  Elisha Wright is a 63 y.o. male who presents to the emergency department from home, by private vehicle for evaluation of left hand weakness, abnormal gait. Failure reports that Friday morning when the patient woke up he had problems using his left arm. Patient's wife reports that he also seem altered and was mumbling his words. Wife reports that the patient stated that he cannot see the TV. Family tried to bring the patient in the hospital however he refused. Parent reports that they were finally able to get him to come to the hospital today. Patient denies any pain. Patient denies any chest pain or shortness of breath. Patient denies any numbness tingling. Patient stated that he did not want to be here at the hospital.  ROS limited due to the patient not answering questions. The patient has no other acute complaints at this time. PAST MEDICAL AND SURGICAL HISTORY     Past Medical History:   Diagnosis Date    Hypertension      No past surgical history on file. MEDICATIONS   No current facility-administered medications for this encounter.     Current Outpatient Medications:     aspirin (ASPIRIN CHILDRENS) 81 MG chewable tablet, Take 1 tablet by mouth daily, Disp: 30 tablet, Rfl: 0    clopidogrel (PLAVIX) 75 MG tablet, Take 1 tablet by mouth daily, Disp: 30 tablet, Rfl: 0    atorvastatin (LIPITOR) 40 MG tablet, Take 1 tablet by mouth daily, Disp: 30 tablet, Rfl: 3    indomethacin (INDOCIN) 50 MG capsule, Take 1 capsule by mouth 2 times daily (with meals), Disp: 20 capsule, Rfl: 0    etodolac

## 2023-08-28 NOTE — ED TRIAGE NOTES
Presents to ED with c/o altered mental status that started Friday. Wife at bedside and states he had trouble using his left arm. Patient alert and oriented. Respirations easy and unlabored.

## 2023-08-29 LAB
EKG ATRIAL RATE: 80 BPM
EKG P AXIS: 84 DEGREES
EKG P-R INTERVAL: 130 MS
EKG Q-T INTERVAL: 382 MS
EKG QRS DURATION: 92 MS
EKG QTC CALCULATION (BAZETT): 440 MS
EKG R AXIS: -83 DEGREES
EKG T AXIS: 70 DEGREES
EKG VENTRICULAR RATE: 80 BPM

## 2023-09-08 ENCOUNTER — NURSE ONLY (OUTPATIENT)
Dept: LAB | Age: 58
End: 2023-09-08

## 2023-09-08 ENCOUNTER — OFFICE VISIT (OUTPATIENT)
Dept: FAMILY MEDICINE CLINIC | Age: 58
End: 2023-09-08

## 2023-09-08 VITALS
WEIGHT: 135 LBS | RESPIRATION RATE: 16 BRPM | HEART RATE: 67 BPM | SYSTOLIC BLOOD PRESSURE: 110 MMHG | OXYGEN SATURATION: 100 % | TEMPERATURE: 98.3 F | BODY MASS INDEX: 17.32 KG/M2 | DIASTOLIC BLOOD PRESSURE: 68 MMHG | HEIGHT: 74 IN

## 2023-09-08 DIAGNOSIS — Z86.73 HISTORY OF ISCHEMIC STROKE: Primary | ICD-10-CM

## 2023-09-08 DIAGNOSIS — Z13.9 SCREENING DUE: ICD-10-CM

## 2023-09-08 DIAGNOSIS — F10.20 ETOHISM (HCC): ICD-10-CM

## 2023-09-08 DIAGNOSIS — Z86.73 HISTORY OF ISCHEMIC STROKE: ICD-10-CM

## 2023-09-08 LAB
ALBUMIN SERPL BCG-MCNC: 4.4 G/DL (ref 3.5–5.1)
ALP SERPL-CCNC: 92 U/L (ref 38–126)
ALT SERPL W/O P-5'-P-CCNC: 24 U/L (ref 11–66)
ANION GAP SERPL CALC-SCNC: 16 MEQ/L (ref 8–16)
APTT PPP: 30.2 SECONDS (ref 22–38)
AST SERPL-CCNC: 39 U/L (ref 5–40)
BASOPHILS ABSOLUTE: 0.1 THOU/MM3 (ref 0–0.1)
BASOPHILS NFR BLD AUTO: 0.8 %
BILIRUB SERPL-MCNC: 0.6 MG/DL (ref 0.3–1.2)
BUN SERPL-MCNC: 12 MG/DL (ref 7–22)
CALCIUM SERPL-MCNC: 9.9 MG/DL (ref 8.5–10.5)
CHLORIDE SERPL-SCNC: 98 MEQ/L (ref 98–111)
CHOLEST SERPL-MCNC: 115 MG/DL (ref 100–199)
CO2 SERPL-SCNC: 23 MEQ/L (ref 23–33)
CREAT SERPL-MCNC: 1.1 MG/DL (ref 0.4–1.2)
DEPRECATED RDW RBC AUTO: 46.5 FL (ref 35–45)
EOSINOPHIL NFR BLD AUTO: 3.6 %
EOSINOPHILS ABSOLUTE: 0.3 THOU/MM3 (ref 0–0.4)
ERYTHROCYTE [DISTWIDTH] IN BLOOD BY AUTOMATED COUNT: 14.6 % (ref 11.5–14.5)
FOLATE SERPL-MCNC: 10.2 NG/ML (ref 4.8–24.2)
GFR SERPL CREATININE-BSD FRML MDRD: > 60 ML/MIN/1.73M2
GLUCOSE FASTING: 95 MG/DL (ref 70–108)
HCT VFR BLD AUTO: 50.3 % (ref 42–52)
HDLC SERPL-MCNC: 57 MG/DL
HGB BLD-MCNC: 16.4 GM/DL (ref 14–18)
IMM GRANULOCYTES # BLD AUTO: 0.01 THOU/MM3 (ref 0–0.07)
IMM GRANULOCYTES NFR BLD AUTO: 0.1 %
INR PPP: 0.84 (ref 0.85–1.13)
LDLC SERPL CALC-MCNC: 36 MG/DL
LYMPHOCYTES ABSOLUTE: 3.3 THOU/MM3 (ref 1–4.8)
LYMPHOCYTES NFR BLD AUTO: 43.1 %
MCH RBC QN AUTO: 28.8 PG (ref 26–33)
MCHC RBC AUTO-ENTMCNC: 32.6 GM/DL (ref 32.2–35.5)
MCV RBC AUTO: 88.2 FL (ref 80–94)
MONOCYTES ABSOLUTE: 0.6 THOU/MM3 (ref 0.4–1.3)
MONOCYTES NFR BLD AUTO: 7.9 %
NEUTROPHILS NFR BLD AUTO: 44.5 %
NRBC BLD AUTO-RTO: 0 /100 WBC
PLATELET # BLD AUTO: 433 THOU/MM3 (ref 130–400)
PMV BLD AUTO: 9.5 FL (ref 9.4–12.4)
POTASSIUM SERPL-SCNC: 4.6 MEQ/L (ref 3.5–5.2)
PROT SERPL-MCNC: 7.8 G/DL (ref 6.1–8)
PSA SERPL-MCNC: 0.88 NG/ML (ref 0–1)
RBC # BLD AUTO: 5.7 MILL/MM3 (ref 4.7–6.1)
SEGMENTED NEUTROPHILS ABSOLUTE COUNT: 3.4 THOU/MM3 (ref 1.8–7.7)
SODIUM SERPL-SCNC: 137 MEQ/L (ref 135–145)
TRIGL SERPL-MCNC: 110 MG/DL (ref 0–199)
TSH SERPL DL<=0.005 MIU/L-ACNC: 1.51 UIU/ML (ref 0.4–4.2)
VIT B12 SERPL-MCNC: 626 PG/ML (ref 211–911)
WBC # BLD AUTO: 7.6 THOU/MM3 (ref 4.8–10.8)

## 2023-09-08 RX ORDER — THIAMINE MONONITRATE (VIT B1) 100 MG
100 TABLET ORAL DAILY
Qty: 30 TABLET | Refills: 3 | Status: SHIPPED | OUTPATIENT
Start: 2023-09-08

## 2023-09-08 RX ORDER — FOLIC ACID 1 MG/1
1 TABLET ORAL DAILY
Qty: 90 TABLET | Refills: 1 | Status: SHIPPED | OUTPATIENT
Start: 2023-09-08

## 2023-09-08 SDOH — ECONOMIC STABILITY: FOOD INSECURITY: WITHIN THE PAST 12 MONTHS, YOU WORRIED THAT YOUR FOOD WOULD RUN OUT BEFORE YOU GOT MONEY TO BUY MORE.: OFTEN TRUE

## 2023-09-08 SDOH — ECONOMIC STABILITY: FOOD INSECURITY: WITHIN THE PAST 12 MONTHS, THE FOOD YOU BOUGHT JUST DIDN'T LAST AND YOU DIDN'T HAVE MONEY TO GET MORE.: OFTEN TRUE

## 2023-09-08 SDOH — ECONOMIC STABILITY: INCOME INSECURITY: HOW HARD IS IT FOR YOU TO PAY FOR THE VERY BASICS LIKE FOOD, HOUSING, MEDICAL CARE, AND HEATING?: SOMEWHAT HARD

## 2023-09-08 SDOH — ECONOMIC STABILITY: HOUSING INSECURITY
IN THE LAST 12 MONTHS, WAS THERE A TIME WHEN YOU DID NOT HAVE A STEADY PLACE TO SLEEP OR SLEPT IN A SHELTER (INCLUDING NOW)?: NO

## 2023-09-08 ASSESSMENT — PATIENT HEALTH QUESTIONNAIRE - PHQ9
SUM OF ALL RESPONSES TO PHQ9 QUESTIONS 1 & 2: 0
SUM OF ALL RESPONSES TO PHQ QUESTIONS 1-9: 0
2. FEELING DOWN, DEPRESSED OR HOPELESS: 0
1. LITTLE INTEREST OR PLEASURE IN DOING THINGS: 0
SUM OF ALL RESPONSES TO PHQ QUESTIONS 1-9: 0

## 2023-09-08 ASSESSMENT — ENCOUNTER SYMPTOMS
BACK PAIN: 0
SORE THROAT: 0
VOMITING: 0
CHEST TIGHTNESS: 0
TROUBLE SWALLOWING: 0
ABDOMINAL DISTENTION: 0
SINUS PAIN: 0
ABDOMINAL PAIN: 0
NAUSEA: 0
VOICE CHANGE: 0
DIARRHEA: 0
SHORTNESS OF BREATH: 0

## 2023-09-08 NOTE — PATIENT INSTRUCTIONS
Thank you   Thank you for trusting us with your healthcare needs. You may receive a survey regarding today's visit. It would help us out if you would take a few moments to provide your feedback. We value your input. Please bring in ALL medications BOTTLES, including inhalers, herbal supplements, over the counter, prescribed & non-prescribed medicine. The office would like actual medication bottles and a list.   Please note our OFFICE POLICIES:   Prior to getting your labs drawn, please check with your insurance company for benefits and eligibility of lab services. Often, insurance companies cover certain tests for preventative visits only. It is patient's responsibility to see what is covered. We are unable to change a diagnosis after the test has been performed. Please hold onto your original lab orders and take them to your lab to be completed. If you no show your scheduled appointment three times, you will be dismissed from this practice. Reschedules must be completed 24 hours prior to your schedule appointment. If the list below has been completed, PLEASE FAX RECORDS TO OUR OFFICE @ [phone].  Once the records have been received we will update your records at our office:  Health Maintenance Due   Topic Date Due    Pneumococcal 0-64 years Vaccine (1 - PCV) Never done    Lipids  Never done    Depression Screen  Never done    HIV screen  Never done    Hepatitis C screen  Never done    DTaP/Tdap/Td vaccine (1 - Tdap) Never done    Colorectal Cancer Screen  Never done    Shingles vaccine (1 of 2) Never done    COVID-19 Vaccine (3 - Booster for Moderna series) 06/04/2021    Flu vaccine (1) Never done          Tobacco Cessation Programs     Telephonic behavior modification  1-800-QUIT-NOW (858-5232)  Counseling service for those who are ready to quit using tobacco.    Available for uninsured [place] residents, Medicaid recipients, pregnant women, or patients whose health plans or employers are members

## 2023-09-08 NOTE — PROGRESS NOTES
Health Maintenance Due   Topic Date Due    Pneumococcal 0-64 years Vaccine (1 - PCV) Never done    Lipids  Never done    Depression Screen  Never done    HIV screen  Never done    Hepatitis C screen  Never done    DTaP/Tdap/Td vaccine (1 - Tdap) Never done    Colorectal Cancer Screen  Never done    Shingles vaccine (1 of 2) Never done    COVID-19 Vaccine (3 - Booster for Moderna series) 06/04/2021    Flu vaccine (1) Never done
I saw and evaluated the patient, performing the key elements of the service. I discussed the findings, assessment and plan with the resident and agree with the resident's findings and plan as documented in the resident's note.
tablet     Refill:  1       Future Appointments   Date Time Provider 4600  [address]   10/5/2023  9:00 AM Balbina Barraza MD [facility]       Patient given educational materials - see patient instructions. Discussed use, benefit, and sideeffects of prescribed medications. All patient questions answered. Pt voiced understanding. Reviewed health maintenance. Instructed to continue current medications, diet and exercise. Patient agreed with treatment plan. Follow up as directed.      Electronically signed by Balbina Barraza MD on 9/8/2023 at 12:40 PM

## 2023-09-11 ENCOUNTER — TELEPHONE (OUTPATIENT)
Dept: FAMILY MEDICINE CLINIC | Age: 58
End: 2023-09-11

## 2023-09-11 LAB — VIT B1 PYROPHOSHATE BLD-SCNC: 84 NMOL/L (ref 70–180)

## 2023-09-11 NOTE — TELEPHONE ENCOUNTER
----- Message from Davis Deshpande MD sent at 9/11/2023  1:31 PM EDT -----  Results noted will discuss with patient next visit.

## 2023-09-20 ENCOUNTER — TELEPHONE (OUTPATIENT)
Dept: FAMILY MEDICINE CLINIC | Age: 58
End: 2023-09-20

## 2023-09-20 DIAGNOSIS — Z86.73 HISTORY OF ISCHEMIC STROKE: Primary | ICD-10-CM

## 2023-09-20 NOTE — TELEPHONE ENCOUNTER
[facility] ([facility]) neurology not excepting new patients at this time patient needs new referral to neurology. Patient was referred to [facility] to Dr. Nhung Rivas.

## 2023-10-02 ENCOUNTER — HOSPITAL ENCOUNTER (OUTPATIENT)
Dept: MRI IMAGING | Age: 58
Discharge: HOME | End: 2023-10-02

## 2023-10-02 DIAGNOSIS — Z86.73 HISTORY OF ISCHEMIC STROKE: ICD-10-CM

## 2023-10-02 PROCEDURE — 6360000004 HC RX CONTRAST MEDICATION

## 2023-10-02 PROCEDURE — A9579 GAD-BASE MR CONTRAST NOS,1ML: HCPCS

## 2023-10-02 PROCEDURE — 70553 MRI BRAIN STEM W/O & W/DYE: CPT

## 2023-10-02 RX ADMIN — GADOTERIDOL 10 ML: 279.3 INJECTION, SOLUTION INTRAVENOUS at 06:50

## 2023-10-05 ENCOUNTER — OFFICE VISIT (OUTPATIENT)
Dept: FAMILY MEDICINE CLINIC | Age: 58
End: 2023-10-05

## 2023-10-05 VITALS
BODY MASS INDEX: 16.79 KG/M2 | HEIGHT: 74 IN | OXYGEN SATURATION: 98 % | SYSTOLIC BLOOD PRESSURE: 118 MMHG | WEIGHT: 130.8 LBS | TEMPERATURE: 97.3 F | DIASTOLIC BLOOD PRESSURE: 72 MMHG | HEART RATE: 70 BPM | RESPIRATION RATE: 14 BRPM

## 2023-10-05 DIAGNOSIS — F10.20 ETOHISM (HCC): ICD-10-CM

## 2023-10-05 DIAGNOSIS — Z86.73 HISTORY OF ISCHEMIC STROKE: Primary | ICD-10-CM

## 2023-10-05 PROCEDURE — 99213 OFFICE O/P EST LOW 20 MIN: CPT

## 2023-10-05 RX ORDER — CLOPIDOGREL BISULFATE 75 MG/1
75 TABLET ORAL DAILY
Qty: 90 TABLET | Refills: 1 | Status: SHIPPED | OUTPATIENT
Start: 2023-10-05 | End: 2024-04-02

## 2023-10-05 RX ORDER — ASPIRIN 81 MG/1
81 TABLET, CHEWABLE ORAL DAILY
Qty: 90 TABLET | Refills: 1 | Status: SHIPPED | OUTPATIENT
Start: 2023-10-05 | End: 2024-04-02

## 2023-10-05 ASSESSMENT — ENCOUNTER SYMPTOMS
SINUS PRESSURE: 0
SHORTNESS OF BREATH: 0
TROUBLE SWALLOWING: 0
CHOKING: 0
ABDOMINAL PAIN: 0
BLOOD IN STOOL: 0
EYE PAIN: 0
SINUS PAIN: 0
COUGH: 0
BACK PAIN: 0
CONSTIPATION: 0

## 2023-10-05 NOTE — PATIENT INSTRUCTIONS
Ensure or boost at [facility] good RX for prescription savings.

## 2023-10-05 NOTE — PROGRESS NOTES
Social work referral for no insurance coverage. [facility] referral. Patient has no insurance and cannot afford medications.

## 2023-10-06 ENCOUNTER — TELEPHONE (OUTPATIENT)
Dept: PHARMACY | Age: 58
End: 2023-10-06

## 2023-10-06 NOTE — TELEPHONE ENCOUNTER
Reached out to patient to screen for [facility]. I left voicemail with my phone number for patient to call me back regarding assistance with cost of medication.    Gilmar Kim [facility] - Prescription Assistance (ext. [phone]) 10/6/2023,9:01 AM

## 2023-10-09 ENCOUNTER — CARE COORDINATION (OUTPATIENT)
Dept: CARE COORDINATION | Age: 58
End: 2023-10-09

## 2023-10-09 ENCOUNTER — ENROLLMENT (OUTPATIENT)
Dept: CARE COORDINATION | Age: 58
End: 2023-10-09

## 2023-10-09 NOTE — CARE COORDINATION
SW called pt after referral from Adelia Morales. Pt has no ins. Introduced self and my role. Inquired about needs. Advised them to go to [address] [place] [address] and inquired if they know where it is. Wife stated she did. She uses bus for transportation. Will mail out resources to pt. Wife voiced understanding. Plan of care:  Assist with obtaining Ins. Provide resources.

## 2023-10-12 ENCOUNTER — CARE COORDINATION (OUTPATIENT)
Dept: CARE COORDINATION | Age: 58
End: 2023-10-12

## 2023-10-12 NOTE — CARE COORDINATION
SW mailed out [employer] to pt and included a [facility] financial assistance form. Will follow up and make sure they receive.

## 2023-10-26 ENCOUNTER — CARE COORDINATION (OUTPATIENT)
Dept: CARE COORDINATION | Age: 58
End: 2023-10-26

## 2023-10-26 NOTE — CARE COORDINATION
SW followed up with recent mailing of [address] [facility] and [employer]  to make sure pt received. Spoke with Luna Lewis\" emergency contact who stated he did receive. Inquired about additional needs/concerns. She reported none at this time. Advised a call back for any needs that may arise. Understanding voiced. Contact information provided. Will resolve at this time.

## 2024-01-04 DIAGNOSIS — F10.20 ETOHISM (HCC): ICD-10-CM

## 2024-01-04 RX ORDER — LANOLIN ALCOHOL/MO/W.PET/CERES
100 CREAM (GRAM) TOPICAL DAILY
Qty: 30 TABLET | Refills: 2 | Status: SHIPPED | OUTPATIENT
Start: 2024-01-04

## 2024-01-04 RX ORDER — FOLIC ACID 1 MG/1
1000 TABLET ORAL DAILY
Qty: 90 TABLET | Refills: 2 | Status: SHIPPED | OUTPATIENT
Start: 2024-01-04

## 2024-01-04 NOTE — TELEPHONE ENCOUNTER
Medications refilled.    An electronic signature was used to authenticate this note  - Tyra Gibbs MD PGY-1 on 1/4/2024 at 10:25 AM

## 2024-03-29 DIAGNOSIS — Z86.73 HISTORY OF ISCHEMIC STROKE: ICD-10-CM

## 2024-03-29 RX ORDER — CLOPIDOGREL BISULFATE 75 MG/1
75 TABLET ORAL DAILY
Qty: 90 TABLET | Refills: 0 | Status: SHIPPED | OUTPATIENT
Start: 2024-03-29

## 2024-03-29 NOTE — TELEPHONE ENCOUNTER
Will refill Plavix, patient needs to make an appointment so that he can be evaluated by physician.    An electronic signature was used to authenticate this note  - Tyra Gibbs MD PGY-1 on 3/29/2024 at 8:22 AM

## 2024-03-29 NOTE — TELEPHONE ENCOUNTER
Patient's last appointment was : 10/5/2023 with our   Patient's next appointment is : Visit date not found   Last refilled on: 10-5-23  Which pharmacy does the script need sent to: [facility] [address]      No results found for: \"LABA1C\"  Lab Results   Component Value Date    CHOL 115 09/08/2023    TRIG 110 09/08/2023    HDL 57 09/08/2023    LDLCALC 36 09/08/2023     Lab Results   Component Value Date     09/08/2023    K 4.6 09/08/2023    CL 98 09/08/2023    CO2 23 09/08/2023    BUN 12 09/08/2023    CREATININE 1.1 09/08/2023    GLUCOSE 95 08/28/2023    CALCIUM 9.9 09/08/2023    PROT 7.8 09/08/2023    LABALBU 4.4 09/08/2023    BILITOT 0.6 09/08/2023    ALKPHOS 92 09/08/2023    AST 39 09/08/2023    ALT 24 09/08/2023    LABGLOM >60 09/08/2023     Lab Results   Component Value Date    TSH 1.510 09/08/2023     Lab Results   Component Value Date    WBC 7.6 09/08/2023    HGB 16.4 09/08/2023    HCT 50.3 09/08/2023    MCV 88.2 09/08/2023     (H) 09/08/2023

## 2024-06-30 DIAGNOSIS — Z86.73 HISTORY OF ISCHEMIC STROKE: ICD-10-CM

## 2024-07-01 NOTE — TELEPHONE ENCOUNTER
Patient's last appointment was : 10/5/2023 with our   Patient's next appointment is : Visit date not found   Last refilled on: 3-29-24  Which pharmacy does the script need sent to: [facility]      No results found for: \"LABA1C\"  Lab Results   Component Value Date    CHOL 115 09/08/2023    TRIG 110 09/08/2023    HDL 57 09/08/2023     Lab Results   Component Value Date     09/08/2023    K 4.6 09/08/2023    CL 98 09/08/2023    CO2 23 09/08/2023    BUN 12 09/08/2023    CREATININE 1.1 09/08/2023    GLUCOSE 95 08/28/2023    CALCIUM 9.9 09/08/2023    BILITOT 0.6 09/08/2023    ALKPHOS 92 09/08/2023    AST 39 09/08/2023    ALT 24 09/08/2023    LABGLOM >60 09/08/2023     Lab Results   Component Value Date    TSH 1.510 09/08/2023     Lab Results   Component Value Date    WBC 7.6 09/08/2023    HGB 16.4 09/08/2023    HCT 50.3 09/08/2023    MCV 88.2 09/08/2023     (H) 09/08/2023

## 2024-07-02 RX ORDER — CLOPIDOGREL BISULFATE 75 MG/1
75 TABLET ORAL DAILY
Qty: 90 TABLET | Refills: 0 | Status: SHIPPED | OUTPATIENT
Start: 2024-07-02

## 2024-07-02 NOTE — TELEPHONE ENCOUNTER
Please have patient make an appointment with a provider. Prescription refilled. Thanks.    An electronic signature was used to authenticate this note  - Tyra Gibbs MD PGY-2 on 7/2/2024 at 8:10 AM

## 2024-11-08 ENCOUNTER — CARE COORDINATION (OUTPATIENT)
Dept: CARE COORDINATION | Age: 59
End: 2024-11-08

## 2024-11-22 ENCOUNTER — HOSPITAL ENCOUNTER (EMERGENCY)
Age: 59
Discharge: HOME | End: 2024-11-22

## 2024-11-22 VITALS
BODY MASS INDEX: 16.04 KG/M2 | DIASTOLIC BLOOD PRESSURE: 79 MMHG | RESPIRATION RATE: 16 BRPM | OXYGEN SATURATION: 98 % | TEMPERATURE: 99.3 F | SYSTOLIC BLOOD PRESSURE: 132 MMHG | HEART RATE: 95 BPM | HEIGHT: 74 IN | WEIGHT: 125 LBS

## 2024-11-22 DIAGNOSIS — K61.0 PERIANAL ABSCESS: Primary | ICD-10-CM

## 2024-11-22 PROCEDURE — 6370000000 HC RX 637 (ALT 250 FOR IP): Performed by: NURSE PRACTITIONER

## 2024-11-22 PROCEDURE — 99283 EMERGENCY DEPT VISIT LOW MDM: CPT

## 2024-11-22 RX ORDER — SULFAMETHOXAZOLE AND TRIMETHOPRIM 800; 160 MG/1; MG/1
1 TABLET ORAL 2 TIMES DAILY
Qty: 20 TABLET | Refills: 0 | Status: SHIPPED | OUTPATIENT
Start: 2024-11-22 | End: 2024-12-02

## 2024-11-22 RX ORDER — CEPHALEXIN 500 MG/1
500 CAPSULE ORAL 4 TIMES DAILY
Qty: 28 CAPSULE | Refills: 0 | Status: SHIPPED | OUTPATIENT
Start: 2024-11-22 | End: 2024-11-29

## 2024-11-22 RX ORDER — SULFAMETHOXAZOLE AND TRIMETHOPRIM 800; 160 MG/1; MG/1
1 TABLET ORAL ONCE
Status: COMPLETED | OUTPATIENT
Start: 2024-11-22 | End: 2024-11-22

## 2024-11-22 RX ORDER — NAPROXEN 500 MG/1
500 TABLET ORAL 2 TIMES DAILY PRN
Qty: 60 TABLET | Refills: 0 | Status: SHIPPED | OUTPATIENT
Start: 2024-11-22

## 2024-11-22 RX ADMIN — SULFAMETHOXAZOLE AND TRIMETHOPRIM 1 TABLET: 800; 160 TABLET ORAL at 19:18

## 2024-11-22 RX ADMIN — CEPHALEXIN 500 MG: 250 CAPSULE ORAL at 19:18

## 2024-11-22 ASSESSMENT — PAIN - FUNCTIONAL ASSESSMENT: PAIN_FUNCTIONAL_ASSESSMENT: NONE - DENIES PAIN

## 2024-11-22 NOTE — ED TRIAGE NOTES
Pt presents to ED for abscess. Pt states he has had an abscess on his butt for the past 3-4 days and is painful to sit.

## 2024-11-23 NOTE — ED PROVIDER NOTES
patient’s provisional diagnosis and plan of care were discussed with the patient and present family who expressed understanding and agreement with the POC. Any medications were reviewed and indications and risks of medications were discussed with the patient /family present. Strict verbal and written return precautions, instructions and appropriate follow-up provided to  the patient.   Patient was DISCHARGED from the hospital. Based on the reassuring ED workup and patient's stable vital signs, I feel the patient may be safely discharged home. At this point in time, I believe the patient has the mental capacity to make medical decisions.      No notes of EC Admission Criteria type on file.        Patient was seen independently by myself. The patient's final impression and disposition and plan was determined by myself.     Strict return precautions and follow up instructions were discussed with the patient prior to discharge, with which the patient agrees.    Physical assessment findings, diagnostic testing(s) if applicable, and vital signs reviewed with patient/patient representative.  Questions answered.   Medications asdirected, including OTC medications for supportive care.   Education provided on medications.  Differential diagnosis(s) discussed with patient/patient representative.  Home care/self care instructions reviewed withpatient/patient representative.  Patient is to follow-up with family care provider in 2-3 days if no improvement.  Patient is to go to the emergency department if symptoms worsen.  Patient/patient representative isaware of care plan, questions answered, verbalizes understanding and is in agreement.     ED Medications administered this visit:  (None if blank)  Medications   sulfamethoxazole-trimethoprim (BACTRIM DS;SEPTRA DS) 800-160 MG per tablet 1 tablet (1 tablet Oral Given 11/22/24 1918)   cephALEXin (KEFLEX) capsule 500 mg (500 mg Oral Given 11/22/24 1918)

## 2024-11-23 NOTE — DISCHARGE INSTRUCTIONS
Apply warm compresses to the area multiple times a day or do sitz baths  Monitor for the abscess to get larger, expand, be more painful, develop a fever or cause pain with bowel movements.  Return if you note any of these symptoms.    Take all antibiotics.    It is imperative that you follow up with General Surgery (Dr. Curtis's office) for re-evaluation incase it does not heal/resolve or goes deeper.    If anything changes, please return to the ER.

## 2025-05-23 ENCOUNTER — HOSPITAL ENCOUNTER (EMERGENCY)
Age: 60
Discharge: HOME | End: 2025-05-23

## 2025-05-23 VITALS
HEART RATE: 72 BPM | SYSTOLIC BLOOD PRESSURE: 117 MMHG | TEMPERATURE: 98.2 F | OXYGEN SATURATION: 100 % | RESPIRATION RATE: 18 BRPM | DIASTOLIC BLOOD PRESSURE: 69 MMHG

## 2025-05-23 DIAGNOSIS — K40.90 UNILATERAL INGUINAL HERNIA WITHOUT OBSTRUCTION OR GANGRENE, RECURRENCE NOT SPECIFIED: Primary | ICD-10-CM

## 2025-05-23 PROCEDURE — 99282 EMERGENCY DEPT VISIT SF MDM: CPT

## 2025-05-23 ASSESSMENT — PAIN - FUNCTIONAL ASSESSMENT: PAIN_FUNCTIONAL_ASSESSMENT: NONE - DENIES PAIN

## 2025-05-23 NOTE — ED TRIAGE NOTES
Pt presents to the ED from home with complaints of having a knot come and go in his groin area. Pt states his wife is concerned it might be a hernia. Pt denies any pain at this time.

## 2025-05-23 NOTE — DISCHARGE INSTRUCTIONS
Avoid heavy lifting or straining.    Take Tylenol per over-the-counter instructions as needed for pain.    Return to the emergency department for development of pain, vomiting, inability to have a bowel movement, swelling in the groin that will not go down, change in skin color, or any other care concern.

## 2025-05-23 NOTE — ED PROVIDER NOTES
[facility] EMERGENCY DEPARTMENT      EMERGENCY MEDICINE     Pt Name: Alton Wagner  MRN: [id number]  Birthdate 1965  Date of evaluation: 5/23/2025  Provider: Wilda Faria PA-C    CHIEF COMPLAINT       Chief Complaint   Patient presents with    Groin Swelling     HISTORY OF PRESENT ILLNESS   Alton Wagner is a pleasant 60 y.o. male who presents to the emergency department from from home, by private vehicle for evaluation of right groin swelling.  Patient states he has intermittent groin swelling on the right side for the last few months.  Patient notices it when he has increased intra-abdominal pressure, and states it goes away when he is relaxing.       PASTMEDICAL HISTORY     Past Medical History:   Diagnosis Date    Hypertension        There is no problem list on file for this patient.    SURGICAL HISTORY     History reviewed. No pertinent surgical history.    CURRENT MEDICATIONS       Discharge Medication List as of 5/23/2025  4:13 PM        CONTINUE these medications which have NOT CHANGED    Details   naproxen (NAPROSYN) 500 MG tablet Take 1 tablet by mouth 2 times daily as needed for Pain, Disp-60 tablet, R-0Normal      clopidogrel (PLAVIX) 75 MG tablet Take 1 tablet by mouth once daily, Disp-90 tablet, R-0Normal      thiamine 100 MG tablet Take 1 tablet by mouth once daily, Disp-30 tablet, R-2Normal      folic acid (FOLVITE) 1 MG tablet Take 1 tablet by mouth once daily, Disp-90 tablet, R-2Normal      aspirin (ASPIRIN CHILDRENS) 81 MG chewable tablet Take 1 tablet by mouth daily, Disp-90 tablet, R-1Normal      atorvastatin (LIPITOR) 40 MG tablet Take 1 tablet by mouth daily, Disp-30 tablet, R-3Normal      acetaminophen (TYLENOL) 500 MG tablet Take 1 tablet by mouth 4 times daily as needed for Pain, Disp-360 tablet, R-1Normal             ALLERGIES     has no known allergies.    FAMILY HISTORY     has no family status information on file.        SOCIAL HISTORY       Social History  Problem List This Visit:         Chief Complaint   Patient presents with    Groin Swelling            Differential Diagnosis includes (but not limited to):  Hernia (incarceration), varicocele, epididymitis,             Pertinent Comorbid Conditions:    None    2)  Data Reviewed (none if left blank)          My Independent interpretations:     EKG:      None    Imaging: None    Labs:      None                 Decision Rules/Clinical Scores utilized:  None            External Documentation Reviewed:         Previous patient encounter documents & history available on EMR was reviewed See MDM             See Formal Diagnostic Results above for the lab and radiology tests and orders.    3)  Treatment and Disposition         ED Reassessment: Stable         Case discussed with consulting clinician:  See MDM         Shared Decision-Making was performed and disposition discussed with the        Patient/Family and questions answered NA         Social determinants of health impacting treatment or disposition:  None               Summary of Patient Presentation:      MDM  Number of Diagnoses or Management Options  Unilateral inguinal hernia without obstruction or gangrene, recurrence not specified  Diagnosis management comments: Patient presents to the ED with complaints of bulging of his groin.  Patient with easily reducible inguinal hernia.  No skin changes.  No causing pain to patient.  Discussed outpatient follow-up with general surgeon if continued annoyance.  Discussed tricked return precautions for signs or symptoms of incarceration.  Patient agreeable.    /   Vitals Reviewed:    Vitals:    05/23/25 1458   BP: 117/69   Pulse: 72   Resp: 18   Temp: 98.2 °F (36.8 °C)   TempSrc: Oral   SpO2: 100%       The patient was seen and examined. Appropriate diagnostic testing was performed and results reviewed with the patient.      The results of pertinent diagnostic studies and exam findings were discussed. The patient’s